# Patient Record
Sex: FEMALE | Race: WHITE | Employment: FULL TIME | URBAN - METROPOLITAN AREA
[De-identification: names, ages, dates, MRNs, and addresses within clinical notes are randomized per-mention and may not be internally consistent; named-entity substitution may affect disease eponyms.]

---
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
## 2019-01-15 ENCOUNTER — OFFICE VISIT (OUTPATIENT)
Dept: FAMILY MEDICINE | Facility: CLINIC | Age: 49
End: 2019-01-15
Attending: FAMILY MEDICINE
Payer: COMMERCIAL

## 2019-01-15 VITALS
SYSTOLIC BLOOD PRESSURE: 125 MMHG | DIASTOLIC BLOOD PRESSURE: 90 MMHG | WEIGHT: 194 LBS | HEIGHT: 70 IN | BODY MASS INDEX: 27.77 KG/M2 | HEART RATE: 85 BPM

## 2019-01-15 DIAGNOSIS — R53.83 FATIGUE, UNSPECIFIED TYPE: ICD-10-CM

## 2019-01-15 DIAGNOSIS — Z78.0 MENOPAUSE: Primary | ICD-10-CM

## 2019-01-15 DIAGNOSIS — R73.09 ELEVATED GLUCOSE: ICD-10-CM

## 2019-01-15 DIAGNOSIS — M25.50 MULTIPLE JOINT PAIN: ICD-10-CM

## 2019-01-15 DIAGNOSIS — H92.02 LEFT EAR PAIN: ICD-10-CM

## 2019-01-15 DIAGNOSIS — R63.5 WEIGHT GAIN: ICD-10-CM

## 2019-01-15 DIAGNOSIS — Z72.820 POOR SLEEP: ICD-10-CM

## 2019-01-15 DIAGNOSIS — R22.1 NECK SWELLING: ICD-10-CM

## 2019-01-15 LAB
ANION GAP SERPL CALCULATED.3IONS-SCNC: 8 MMOL/L (ref 3–14)
BUN SERPL-MCNC: 9 MG/DL (ref 7–30)
CALCIUM SERPL-MCNC: 9.4 MG/DL (ref 8.5–10.1)
CHLORIDE SERPL-SCNC: 105 MMOL/L (ref 94–109)
CO2 SERPL-SCNC: 26 MMOL/L (ref 20–32)
CREAT SERPL-MCNC: 0.66 MG/DL (ref 0.52–1.04)
CRP SERPL-MCNC: <2.9 MG/L (ref 0–8)
ERYTHROCYTE [DISTWIDTH] IN BLOOD BY AUTOMATED COUNT: 13.9 % (ref 10–15)
ESTRADIOL SERPL-MCNC: 24 PG/ML
FSH SERPL-ACNC: 70.8 IU/L
GFR SERPL CREATININE-BSD FRML MDRD: >90 ML/MIN/{1.73_M2}
GLUCOSE SERPL-MCNC: 101 MG/DL (ref 70–99)
HBA1C MFR BLD: 5.5 % (ref 0–5.6)
HCT VFR BLD AUTO: 46.6 % (ref 35–47)
HGB BLD-MCNC: 15.3 G/DL (ref 11.7–15.7)
MCH RBC QN AUTO: 30 PG (ref 26.5–33)
MCHC RBC AUTO-ENTMCNC: 32.8 G/DL (ref 31.5–36.5)
MCV RBC AUTO: 91 FL (ref 78–100)
PLATELET # BLD AUTO: 316 10E9/L (ref 150–450)
POTASSIUM SERPL-SCNC: 3.7 MMOL/L (ref 3.4–5.3)
PROGEST SERPL-MCNC: <0.2 NG/ML
RBC # BLD AUTO: 5.1 10E12/L (ref 3.8–5.2)
SODIUM SERPL-SCNC: 139 MMOL/L (ref 133–144)
T3FREE SERPL-MCNC: 2.9 PG/ML (ref 2.3–4.2)
T4 FREE SERPL-MCNC: 1.03 NG/DL (ref 0.76–1.46)
TSH SERPL DL<=0.005 MIU/L-ACNC: 1.55 MU/L (ref 0.4–4)
WBC # BLD AUTO: 7.4 10E9/L (ref 4–11)

## 2019-01-15 PROCEDURE — 86140 C-REACTIVE PROTEIN: CPT | Performed by: FAMILY MEDICINE

## 2019-01-15 PROCEDURE — 84443 ASSAY THYROID STIM HORMONE: CPT | Performed by: FAMILY MEDICINE

## 2019-01-15 PROCEDURE — G0463 HOSPITAL OUTPT CLINIC VISIT: HCPCS | Mod: ZF

## 2019-01-15 PROCEDURE — 85027 COMPLETE CBC AUTOMATED: CPT | Performed by: FAMILY MEDICINE

## 2019-01-15 PROCEDURE — 86376 MICROSOMAL ANTIBODY EACH: CPT | Performed by: FAMILY MEDICINE

## 2019-01-15 PROCEDURE — 83036 HEMOGLOBIN GLYCOSYLATED A1C: CPT | Performed by: FAMILY MEDICINE

## 2019-01-15 PROCEDURE — 82306 VITAMIN D 25 HYDROXY: CPT | Performed by: FAMILY MEDICINE

## 2019-01-15 PROCEDURE — 82670 ASSAY OF TOTAL ESTRADIOL: CPT | Performed by: FAMILY MEDICINE

## 2019-01-15 PROCEDURE — 84439 ASSAY OF FREE THYROXINE: CPT | Performed by: FAMILY MEDICINE

## 2019-01-15 PROCEDURE — 36415 COLL VENOUS BLD VENIPUNCTURE: CPT | Performed by: FAMILY MEDICINE

## 2019-01-15 PROCEDURE — 80048 BASIC METABOLIC PNL TOTAL CA: CPT | Performed by: FAMILY MEDICINE

## 2019-01-15 PROCEDURE — 83001 ASSAY OF GONADOTROPIN (FSH): CPT | Performed by: FAMILY MEDICINE

## 2019-01-15 PROCEDURE — 84144 ASSAY OF PROGESTERONE: CPT | Performed by: FAMILY MEDICINE

## 2019-01-15 PROCEDURE — 84481 FREE ASSAY (FT-3): CPT | Performed by: FAMILY MEDICINE

## 2019-01-15 RX ORDER — ELETRIPTAN HYDROBROMIDE 20 MG/1
TABLET, FILM COATED ORAL
Refills: 5 | COMMUNITY
Start: 2018-12-17 | End: 2021-06-15

## 2019-01-15 RX ORDER — TEMAZEPAM 15 MG/1
CAPSULE ORAL
Refills: 2 | COMMUNITY
Start: 2018-12-17 | End: 2021-06-15

## 2019-01-15 RX ORDER — ZINC OXIDE 216 MG/ML
LOTION TOPICAL
COMMUNITY
End: 2023-08-01

## 2019-01-15 RX ORDER — ESTRADIOL 0.04 MG/D
1 PATCH, EXTENDED RELEASE TRANSDERMAL
Qty: 24 PATCH | Refills: 3 | Status: SHIPPED | OUTPATIENT
Start: 2019-01-17 | End: 2019-03-21

## 2019-01-15 SDOH — HEALTH STABILITY: MENTAL HEALTH: HOW MANY STANDARD DRINKS CONTAINING ALCOHOL DO YOU HAVE ON A TYPICAL DAY?: 1 OR 2

## 2019-01-15 SDOH — HEALTH STABILITY: MENTAL HEALTH: HOW OFTEN DO YOU HAVE A DRINK CONTAINING ALCOHOL?: NOT ASKED

## 2019-01-15 ASSESSMENT — ANXIETY QUESTIONNAIRES
3. WORRYING TOO MUCH ABOUT DIFFERENT THINGS: NOT AT ALL
6. BECOMING EASILY ANNOYED OR IRRITABLE: NOT AT ALL
GAD7 TOTAL SCORE: 3
5. BEING SO RESTLESS THAT IT IS HARD TO SIT STILL: NOT AT ALL
7. FEELING AFRAID AS IF SOMETHING AWFUL MIGHT HAPPEN: SEVERAL DAYS
2. NOT BEING ABLE TO STOP OR CONTROL WORRYING: SEVERAL DAYS
1. FEELING NERVOUS, ANXIOUS, OR ON EDGE: NOT AT ALL

## 2019-01-15 ASSESSMENT — PATIENT HEALTH QUESTIONNAIRE - PHQ9
SUM OF ALL RESPONSES TO PHQ QUESTIONS 1-9: 13
5. POOR APPETITE OR OVEREATING: SEVERAL DAYS

## 2019-01-15 ASSESSMENT — PAIN SCALES - GENERAL: PAINLEVEL: NO PAIN (0)

## 2019-01-15 ASSESSMENT — MIFFLIN-ST. JEOR: SCORE: 1594.2
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
## 2019-01-15 NOTE — LETTER
Date:January 17, 2019      Patient was self referred, no letter generated. Do not send.        [facility] Information

## 2019-01-15 NOTE — PATIENT INSTRUCTIONS
Follow-up with lee graves in 6-8 weeks    Await labs: if normal    Start vivelle dot 0.0375 mg   And progesterone 100 mg

## 2019-01-16 LAB
DEPRECATED CALCIDIOL+CALCIFEROL SERPL-MC: 44 UG/L (ref 20–75)
THYROPEROXIDASE AB SERPL-ACNC: <10 IU/ML

## 2019-01-16 ASSESSMENT — ANXIETY QUESTIONNAIRES: GAD7 TOTAL SCORE: 3
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
## 2019-03-21 ENCOUNTER — ANCILLARY PROCEDURE (OUTPATIENT)
Dept: MAMMOGRAPHY | Facility: CLINIC | Age: 49
End: 2019-03-21
Attending: NURSE PRACTITIONER
Payer: COMMERCIAL

## 2019-03-21 ENCOUNTER — OFFICE VISIT (OUTPATIENT)
Dept: OBGYN | Facility: CLINIC | Age: 49
End: 2019-03-21
Attending: NURSE PRACTITIONER
Payer: COMMERCIAL

## 2019-03-21 VITALS
WEIGHT: 200 LBS | SYSTOLIC BLOOD PRESSURE: 120 MMHG | HEART RATE: 81 BPM | BODY MASS INDEX: 28.63 KG/M2 | DIASTOLIC BLOOD PRESSURE: 82 MMHG | HEIGHT: 70 IN

## 2019-03-21 DIAGNOSIS — N95.0 POSTMENOPAUSAL BLEEDING: Primary | ICD-10-CM

## 2019-03-21 DIAGNOSIS — Z12.31 VISIT FOR SCREENING MAMMOGRAM: ICD-10-CM

## 2019-03-21 DIAGNOSIS — Z78.0 MENOPAUSE: ICD-10-CM

## 2019-03-21 PROCEDURE — 77067 SCR MAMMO BI INCL CAD: CPT

## 2019-03-21 RX ORDER — ESTRADIOL 0.04 MG/D
1 PATCH, EXTENDED RELEASE TRANSDERMAL
Qty: 24 PATCH | Refills: 3 | Status: SHIPPED | OUTPATIENT
Start: 2019-03-21 | End: 2020-03-03

## 2019-03-21 ASSESSMENT — PAIN SCALES - GENERAL: PAINLEVEL: NO PAIN (0)

## 2019-03-21 ASSESSMENT — MIFFLIN-ST. JEOR: SCORE: 1621.41
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
## 2019-03-21 NOTE — LETTER
3/21/2019       RE: Richelle Laura  [address]  [facility] [address]     Dear Colleague,    Thank you for referring your patient, Richelle Laura, to the [facility] at [facility]. Please see a copy of my visit note below.  Progress Note    SUBJECTIVE:  Richelle Laura is an 49 year old, , here for follow up on HT start in January, saw Dr Luther.    Reports LNMP 2016 age 46. Started HT and had a bleed in 2017, stopped HT. Started HT 2019 and had a 'normal menses'  and 3/4. Takes continuous Vivelle and Prometrium.    States all menopause symptoms are greatly improved and desires to continue.  Sleeping well, busy with new  position.  Not sexually active, recently   Smokes 3 cigg. /per day  BMI >28, would like  to lose 50 lbs, states normal weight is 150      Divorce in [place] 2018 relocated to [place]. Reports stress with divorce process, relocating and finding work as a .  Supportive family in [place], recently became a grandmother,  20      HISTORY:    Current Outpatient Medications on File Prior to Visit:  eletriptan (RELPAX) 20 MG tablet TK 1 T PO D PRN   estradiol (VIVELLE-DOT) 0.0375 MG/24HR BIW patch Place 1 patch onto the skin twice a week   Nutritional Supplements (NUTRITIONAL SUPPLEMENT PLUS) LIQD Hemp oil   PROGESTERONE 100MG CAPSULES COMPOUND Take 100 capsules by mouth At Bedtime   temazepam (RESTORIL) 15 MG capsule TK 1-2 C PO QD HS   UNABLE TO FIND      No current facility-administered medications on file prior to visit.   Allergies   Allergen Reactions     Sulfa Drugs Hives     PN: Swelling     Compazine [Prochlorperazine]      Phenothiazines      PN:  skin crawls,sleeplessness         There is no immunization history on file for this patient.    Obstetric History       T0      L1     SAB1   TAB0   Ectopic0   Multiple0   Live Births0      Past Medical History:   Diagnosis Date     Migraine      Past Surgical History:    Procedure Laterality Date     ABDOMEN SURGERY        SECTION       KNEE SURGERY       Family History   Problem Relation Age of Onset     Pancreatic Cancer Mother      Breast Cancer Maternal Aunt 34        BRCA 1 gene     Social History     Socioeconomic History     Marital status:      Spouse name: None     Number of children: None     Years of education: None     Highest education level: None   Occupational History     None   Social Needs     Financial resource strain: None     Food insecurity:     Worry: None     Inability: None     Transportation needs:     Medical: None     Non-medical: None   Tobacco Use     Smoking status: Smoker, Current Status Unknown     Types: Cigarettes     Smokeless tobacco: Never Used     Tobacco comment: 3-4 cigarettes a day   Substance and Sexual Activity     Alcohol use: Yes     Alcohol/week: 1.2 - 1.8 oz     Types: 2 - 3 Glasses of wine per week     Drinks per session: 1 or 2     Drug use: Yes     Sexual activity: Not Currently   Lifestyle     Physical activity:     Days per week: None     Minutes per session: None     Stress: None   Relationships     Social connections:     Talks on phone: None     Gets together: None     Attends Faith service: None     Active member of club or organization: None     Attends meetings of clubs or organizations: None     Relationship status: None     Intimate partner violence:     Fear of current or ex partner: None     Emotionally abused: None     Physically abused: None     Forced sexual activity: None   Other Topics Concern     None   Social History Narrative    How much exercise per week? Physical at job-     How much calcium per day? In foods        How much caffeine per day? 1-2 cups    How much vitamin D per day? supplement    Do you/your family wear seatbelts?  Yes    Do you/your family use safety helmets? Yes    Do you/your family use sunscreen? n/a    Do you/your family keep firearms in the home? No    Do you/your  "family have a smoke detector(s)? Yes        January 15, 2019 Refugio Romo LPN       ROS  [unfilled]  PHQ-9 SCORE 1/15/2019   PHQ-9 Total Score 13     BOGDAN-7 SCORE 1/15/2019   Total Score 3         EXAM:  Blood pressure 120/82, pulse 81, height 1.784 m (5' 10.25\"), weight 90.7 kg (200 lb). Body mass index is 28.49 kg/m .  Constitutional: Well appearing woman in no acute distress, appropriately groomed   Oriented to time and place, engaged and interactive   Psychological: Appropriate mood  Eyes symmetrical, sclera clear and white, conjunctiva pink and moist   Mouth: moist mucous membranes, no visible dental caries   Skin: warm and dry, no visible rashes or lesions  Neuro: normal gait, no visible tremor   Musculoskeletal: Full ROM, no muscular weakness visible   30 minutes was spent in direct contact with patient and > 50% of the total time in patient education and coordination of care.     ASSESSMENT:  Encounter Diagnoses   Name Primary?     Postmenopausal bleeding Yes     Menopause         PLAN:   Orders Placed This Encounter   Procedures     US Transvaginal Non OB     US GYN Complete Transvaginal - 28623 (In Clinic)     Continue HT as prescribed, discussed risks an concerns with post menopausal bleeding  Schedule TV US to assess endometrium today. Discussed possibly going to cyclic HT, endometrial biopsy. Await TVUS.  Schedule follow up visit with Dr Luther in May    Virginia Hansen DNP LAURIE    "

## 2019-08-15 ENCOUNTER — ANCILLARY PROCEDURE (OUTPATIENT)
Dept: ULTRASOUND IMAGING | Facility: CLINIC | Age: 49
End: 2019-08-15
Attending: NURSE PRACTITIONER
Payer: COMMERCIAL

## 2019-08-15 ENCOUNTER — OFFICE VISIT (OUTPATIENT)
Dept: FAMILY MEDICINE | Facility: CLINIC | Age: 49
End: 2019-08-15
Attending: FAMILY MEDICINE
Payer: COMMERCIAL

## 2019-08-15 VITALS
WEIGHT: 186.5 LBS | HEART RATE: 73 BPM | SYSTOLIC BLOOD PRESSURE: 121 MMHG | HEIGHT: 70 IN | DIASTOLIC BLOOD PRESSURE: 82 MMHG | BODY MASS INDEX: 26.7 KG/M2

## 2019-08-15 DIAGNOSIS — N95.0 POSTMENOPAUSAL BLEEDING: ICD-10-CM

## 2019-08-15 DIAGNOSIS — G43.819 OTHER MIGRAINE WITHOUT STATUS MIGRAINOSUS, INTRACTABLE: ICD-10-CM

## 2019-08-15 DIAGNOSIS — Z79.890 HORMONE REPLACEMENT THERAPY: Primary | ICD-10-CM

## 2019-08-15 DIAGNOSIS — Z78.0 MENOPAUSE: ICD-10-CM

## 2019-08-15 DIAGNOSIS — N84.0 ENDOMETRIAL POLYP: Primary | ICD-10-CM

## 2019-08-15 PROCEDURE — 76830 TRANSVAGINAL US NON-OB: CPT

## 2019-08-15 PROCEDURE — G0463 HOSPITAL OUTPT CLINIC VISIT: HCPCS | Mod: 25,ZF

## 2019-08-15 RX ORDER — DIHYDROERGOTAMINE MESYLATE 4 MG/ML
1 SPRAY NASAL PRN
Qty: 1 ML | Refills: 3 | Status: SHIPPED | OUTPATIENT
Start: 2019-08-15 | End: 2021-06-15

## 2019-08-15 RX ORDER — ACETAMINOPHEN 325 MG/1
975 TABLET ORAL ONCE
Status: CANCELLED | OUTPATIENT
Start: 2019-08-15 | End: 2019-08-15

## 2019-08-15 ASSESSMENT — PATIENT HEALTH QUESTIONNAIRE - PHQ9
5. POOR APPETITE OR OVEREATING: SEVERAL DAYS
SUM OF ALL RESPONSES TO PHQ QUESTIONS 1-9: 2

## 2019-08-15 ASSESSMENT — ANXIETY QUESTIONNAIRES
2. NOT BEING ABLE TO STOP OR CONTROL WORRYING: NOT AT ALL
1. FEELING NERVOUS, ANXIOUS, OR ON EDGE: SEVERAL DAYS
7. FEELING AFRAID AS IF SOMETHING AWFUL MIGHT HAPPEN: SEVERAL DAYS
GAD7 TOTAL SCORE: 3
5. BEING SO RESTLESS THAT IT IS HARD TO SIT STILL: NOT AT ALL
6. BECOMING EASILY ANNOYED OR IRRITABLE: NOT AT ALL
3. WORRYING TOO MUCH ABOUT DIFFERENT THINGS: NOT AT ALL

## 2019-08-15 ASSESSMENT — MIFFLIN-ST. JEOR: SCORE: 1560.18

## 2019-08-15 ASSESSMENT — PAIN SCALES - GENERAL: PAINLEVEL: NO PAIN (0)
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
## 2019-08-15 NOTE — NURSING NOTE
Chief Complaint   Patient presents with     RECHECK     Pt here to discuss pelvic ultrasound results. Pt having irregular periods and breast tenderness.

## 2019-08-16 ASSESSMENT — ANXIETY QUESTIONNAIRES: GAD7 TOTAL SCORE: 3

## 2019-08-20 ENCOUNTER — TELEPHONE (OUTPATIENT)
Dept: OBGYN | Facility: CLINIC | Age: 49
End: 2019-08-20

## 2019-08-20 NOTE — TELEPHONE ENCOUNTER
Confirmed surgery date with patient 9/10/19 with arrival time at 6:30a.m with nothing to eat eight hours before scheduled surgery time and clear liquids up to two hours before scheduled surgery time, patient sched pre op 9/5/19 with Dr. Luther, also informed patient that a surgery map and letter will be mailed out.     to complete the following fields:            CHECKLIST     Google Calendar : Yes     Resident notified: Not Applicable     Clinic schedule blocked:  Not Applicable    Patient notified:Yes      Pre op information sent: Yes     Given to patient over the phone.Yes    Comments:

## 2019-08-22 ENCOUNTER — TELEPHONE (OUTPATIENT)
Dept: PHARMACY | Facility: CLINIC | Age: 49
End: 2019-08-22

## 2019-09-04 NOTE — PROGRESS NOTES
"Patient Name:  Richelle Laura present for Pre-operative clearance  Procedure: Operative Hysteroscopy, With Morcellator  Surgeon: Dr. Maurer  Date of Surgery:  9.10.2019  Location of Surgery: [facility]   Fax number for pre-op form:  Fax: [place] [phone] & [phone]    Chief Complaint: postmenopausal with abnormal bleeding on HT: estradiol patch 0.0375 and progesterone 100 mg.   Pelvic US showed uterine polyps and lining is 3.9mm        Allergies:   Allergies   Allergen Reactions     Sulfa Drugs Hives     PN: Swelling     Compazine [Prochlorperazine]      Phenothiazines      PN:  skin crawls,sleeplessness        History of anesthesia reaction: none  Blood transfusion: no  Smoker: 3-4 cigs/day  Advanced Directive: Advocate is aunt [Lavern Truong]    PAST MEDICAL HISTORY:  =====================  Past Medical History:   Diagnosis Date     Migraine      PAST SURGICAL HISTORY:  ======================  Past Surgical History:   Procedure Laterality Date     ABDOMEN SURGERY        SECTION       KNEE SURGERY       REVIEW OF SYSTEMS:  ==================  CONSTITUTIONAL: NEGATIVE for fever, chills, change in weight  ENT/MOUTH: NEGATIVE for ear, mouth and throat problems  RESP: NEGATIVE for significant cough or SOB  CV: NEGATIVE for chest pain, palpitations or peripheral edema    MEDICATIONS:  =====  Current Outpatient Medications   Medication     eletriptan (RELPAX) 20 MG tablet     estradiol (VIVELLE-DOT) 0.0375 MG/24HR BIW patch     Nutritional Supplements (NUTRITIONAL SUPPLEMENT PLUS) LIQD     PROGESTERONE 100MG CAPSULES COMPOUND     temazepam (RESTORIL) 15 MG capsule     dihydroergotamine (MIGRANAL) 4 MG/ML nasal spray     EXAM:  =====  /77   Pulse 75   Ht 1.784 m (5' 10.25\")   Wt 85.5 kg (188 lb 6.4 oz)   BMI 26.84 kg/m    GENERAL APPEARANCE: healthy, alert and no distress  HENT: ear canals and TM's normal and nose and mouth without ulcers or lesions  RESP: lungs clear to auscultation - no " rales, rhonchi or wheezes  CV: regular rate and rhythm, normal S1 S2, no S3 or S4 and no murmur, click or rub   ABDOMEN: soft, nontender, no HSM or masses and bowel sounds normal  NEURO: Normal strength and tone, sensory exam grossly normal, mentation intact and speech normal     DIAGNOSTICS:   ============  No labs or EKG required for low risk surgery (cataract, skin procedure, breast biopsy, etc)    IMPRESSION:  ===========  49 year old postmenopausal woman on hormone therapy who will have Operative Hysteroscopy, with Morcellator for uterine polyps and abnormal postmenopausal bleeding by Dr. Maurer on Tuesday, September 10, 2019.      Hormone therapy:         Estradiol (VIVELLE-DOT) 0.0375 MG/24HR; Place 1 patch onto the skin twice a week        Micronized progesterone (PROMETRIUM) 100 MG capsule; Take 1 capsule by mouth daily at HS.    For above listed surgery and anesthesia:   Patient is LOW risk for surgery and perioperative complications.    RECOMMENDATIONS:  ==================  Below recommendations were reviewed with patient  Approval given to proceed with proposed procedure, without further diagnostic evaluation.      Signed Electronically by: Barbi Luther MD    Copy of this consultation report is provided to requesting physician.

## 2019-09-05 ENCOUNTER — OFFICE VISIT (OUTPATIENT)
Dept: FAMILY MEDICINE | Facility: CLINIC | Age: 49
End: 2019-09-05
Attending: FAMILY MEDICINE
Payer: COMMERCIAL

## 2019-09-05 VITALS
DIASTOLIC BLOOD PRESSURE: 77 MMHG | SYSTOLIC BLOOD PRESSURE: 113 MMHG | WEIGHT: 188.4 LBS | HEART RATE: 75 BPM | BODY MASS INDEX: 26.97 KG/M2 | HEIGHT: 70 IN

## 2019-09-05 DIAGNOSIS — Z01.818 PRE-OPERATIVE EXAMINATION: ICD-10-CM

## 2019-09-05 PROCEDURE — G0463 HOSPITAL OUTPT CLINIC VISIT: HCPCS | Mod: ZF

## 2019-09-05 ASSESSMENT — ANXIETY QUESTIONNAIRES
3. WORRYING TOO MUCH ABOUT DIFFERENT THINGS: SEVERAL DAYS
7. FEELING AFRAID AS IF SOMETHING AWFUL MIGHT HAPPEN: SEVERAL DAYS
6. BECOMING EASILY ANNOYED OR IRRITABLE: NOT AT ALL
5. BEING SO RESTLESS THAT IT IS HARD TO SIT STILL: NOT AT ALL
1. FEELING NERVOUS, ANXIOUS, OR ON EDGE: SEVERAL DAYS
2. NOT BEING ABLE TO STOP OR CONTROL WORRYING: SEVERAL DAYS
GAD7 TOTAL SCORE: 5

## 2019-09-05 ASSESSMENT — PATIENT HEALTH QUESTIONNAIRE - PHQ9
5. POOR APPETITE OR OVEREATING: SEVERAL DAYS
SUM OF ALL RESPONSES TO PHQ QUESTIONS 1-9: 5

## 2019-09-05 ASSESSMENT — MIFFLIN-ST. JEOR: SCORE: 1568.8

## 2019-09-05 ASSESSMENT — PAIN SCALES - GENERAL: PAINLEVEL: NO PAIN (0)

## 2019-09-05 NOTE — LETTER
2019       RE: Richelle Laura  [address]  [facility] [address]     Dear Colleague,    Thank you for referring your patient, Richelle Laura, to the [facility] at [facility]. Please see a copy of my visit note below.    Patient Name:  Richelle Laura present for Pre-operative clearance  Procedure: Operative Hysteroscopy, With Morcellator  Surgeon: Dr. Maurer  Date of Surgery:  9.10.2019  Location of Surgery: [facility]   Fax number for pre-op form:  Fax: [place] [phone] & [phone]    Chief Complaint: postmenopausal with abnormal bleeding on HT: estradiol patch 0.0375 and progesterone 100 mg.   Pelvic US showed uterine polyps and lining is 3.9mm        Allergies:   Allergies   Allergen Reactions     Sulfa Drugs Hives     PN: Swelling     Compazine [Prochlorperazine]      Phenothiazines      PN:  skin crawls,sleeplessness        History of anesthesia reaction: none  Blood transfusion: no  Smoker: 3-4 cigs/day  Advanced Directive: Advocate is aunt [Lavern Truong]    PAST MEDICAL HISTORY:  =====================  Past Medical History:   Diagnosis Date     Migraine      PAST SURGICAL HISTORY:  ======================  Past Surgical History:   Procedure Laterality Date     ABDOMEN SURGERY        SECTION       KNEE SURGERY       REVIEW OF SYSTEMS:  ==================  CONSTITUTIONAL: NEGATIVE for fever, chills, change in weight  ENT/MOUTH: NEGATIVE for ear, mouth and throat problems  RESP: NEGATIVE for significant cough or SOB  CV: NEGATIVE for chest pain, palpitations or peripheral edema    MEDICATIONS:  =====  Current Outpatient Medications   Medication     eletriptan (RELPAX) 20 MG tablet     estradiol (VIVELLE-DOT) 0.0375 MG/24HR BIW patch     Nutritional Supplements (NUTRITIONAL SUPPLEMENT PLUS) LIQD     PROGESTERONE 100MG CAPSULES COMPOUND     temazepam (RESTORIL) 15 MG capsule     dihydroergotamine (MIGRANAL) 4 MG/ML nasal spray  "    EXAM:  =====  /77   Pulse 75   Ht 1.784 m (5' 10.25\")   Wt 85.5 kg (188 lb 6.4 oz)   BMI 26.84 kg/m     GENERAL APPEARANCE: healthy, alert and no distress  HENT: ear canals and TM's normal and nose and mouth without ulcers or lesions  RESP: lungs clear to auscultation - no rales, rhonchi or wheezes  CV: regular rate and rhythm, normal S1 S2, no S3 or S4 and no murmur, click or rub   ABDOMEN: soft, nontender, no HSM or masses and bowel sounds normal  NEURO: Normal strength and tone, sensory exam grossly normal, mentation intact and speech normal     DIAGNOSTICS:   ============  No labs or EKG required for low risk surgery (cataract, skin procedure, breast biopsy, etc)    IMPRESSION:  ===========  49 year old postmenopausal woman on hormone therapy who will have Operative Hysteroscopy, with Morcellator for uterine polyps and abnormal postmenopausal bleeding by Dr. Maurer on Tuesday, September 10, 2019.      Hormone therapy:         Estradiol (VIVELLE-DOT) 0.0375 MG/24HR; Place 1 patch onto the skin twice a week        Micronized progesterone (PROMETRIUM) 100 MG capsule; Take 1 capsule by mouth daily at HS.    For above listed surgery and anesthesia:   Patient is LOW risk for surgery and perioperative complications.    RECOMMENDATIONS:  ==================  Below recommendations were reviewed with patient  Approval given to proceed with proposed procedure, without further diagnostic evaluation.    Signed Electronically by: Barbi Luther MD    Copy of this consultation report is provided to requesting physician.        "

## 2019-09-05 NOTE — NURSING NOTE
Chief Complaint   Patient presents with     Pre-Op Exam     9/10/19 Dr. Maurer, Operative Hysteroscopy, With Morcellator

## 2019-09-06 ASSESSMENT — ANXIETY QUESTIONNAIRES: GAD7 TOTAL SCORE: 5

## 2019-09-09 ENCOUNTER — ANESTHESIA EVENT (OUTPATIENT)
Dept: SURGERY | Facility: CLINIC | Age: 49
End: 2019-09-09
Payer: COMMERCIAL

## 2019-09-09 ASSESSMENT — LIFESTYLE VARIABLES: TOBACCO_USE: 1

## 2019-09-09 NOTE — ANESTHESIA PREPROCEDURE EVALUATION
Anesthesia Pre-Procedure Evaluation    Patient: Richelle Laura   MRN:     [id number] Gender:   female   Age:    49 year old :      1970        Preoperative Diagnosis: Irregular Bleeding, Possible Endometrial Polyps   Procedure(s):  Operative Hysteroscopy, With Morcellator     Past Medical History:   Diagnosis Date     Migraine       Past Surgical History:   Procedure Laterality Date     ABDOMEN SURGERY        SECTION       KNEE SURGERY            Anesthesia Evaluation     . Pt has had prior anesthetic.     No history of anesthetic complications          ROS/MED HX    ENT/Pulmonary:     (+)tobacco use, Current use , . .    Neurologic:     (+)migraines,     Cardiovascular:  - neg cardiovascular ROS   (+) ----. : . . . :. . No previous cardiac testing       METS/Exercise Tolerance:  >4 METS   Hematologic:  - neg hematologic  ROS       Musculoskeletal:  - neg musculoskeletal ROS       GI/Hepatic:  - neg GI/hepatic ROS       Renal/Genitourinary:  - ROS Renal section negative       Endo:  - neg endo ROS       Psychiatric:  - neg psychiatric ROS       Infectious Disease:  - neg infectious disease ROS       Malignancy:      - no malignancy   Other:    (+) no H/O Chronic Pain,no other significant disability                        PHYSICAL EXAM:   Mental Status/Neuro: A/A/O   Airway: Facies: Feasible   Respiratory:   Resp. Effort: Normal      CV:   Rate: Age appropriate   Comments:                      LABS:  CBC:   Lab Results   Component Value Date    WBC 7.4 01/15/2019    HGB 15.3 01/15/2019    HCT 46.6 01/15/2019     01/15/2019     BMP:   Lab Results   Component Value Date     01/15/2019    POTASSIUM 3.7 01/15/2019    CHLORIDE 105 01/15/2019    CO2 26 01/15/2019    BUN 9 01/15/2019    CR 0.66 01/15/2019     (H) 01/15/2019     COAGS: No results found for: PTT, INR, FIBR  POC: No results found for: BGM, HCG, HCGS  OTHER:   Lab Results   Component Value Date    A1C 5.5 01/15/2019    JAGDEEP 9.4  "01/15/2019    TSH 1.55 01/15/2019    T4 1.03 01/15/2019    CRP <2.9 01/15/2019        Preop Vitals    BP Readings from Last 3 Encounters:   09/05/19 113/77   08/15/19 121/82   03/21/19 120/82    Pulse Readings from Last 3 Encounters:   09/05/19 75   08/15/19 73   03/21/19 81      Resp Readings from Last 3 Encounters:   No data found for Resp    SpO2 Readings from Last 3 Encounters:   No data found for SpO2      Temp Readings from Last 1 Encounters:   No data found for Temp    Ht Readings from Last 1 Encounters:   09/05/19 1.784 m (5' 10.25\")      Wt Readings from Last 1 Encounters:   09/05/19 85.5 kg (188 lb 6.4 oz)    Estimated body mass index is 26.84 kg/m  as calculated from the following:    Height as of 9/5/19: 1.784 m (5' 10.25\").    Weight as of 9/5/19: 85.5 kg (188 lb 6.4 oz).     LDA:        Assessment:   ASA SCORE: 2    H&P: History and physical reviewed and following examination; no interval change.   Smoking Status:  Active Smoker   NPO Status: NPO Appropriate     Plan:   Anes. Type:  MAC   Pre-Medication: Midazolam   Induction:  IV (Standard)   Airway: Native Airway   Access/Monitoring: PIV   Maintenance: Propofol Sedation     Postop Plan:   Postop Pain: None  Postop Sedation/Airway: Not planned  Disposition: Outpatient     PONV Management: Adult Risk Factors: Female   Prevention: Ondansetron, Dexamethasone, Propofol     CONSENT: Direct conversation   Plan and risks discussed with: Patient                      Randall Ramirez DO  "

## 2019-09-10 ENCOUNTER — HOSPITAL ENCOUNTER (OUTPATIENT)
Facility: CLINIC | Age: 49
Discharge: HOME | End: 2019-09-10
Attending: OBSTETRICS & GYNECOLOGY | Admitting: OBSTETRICS & GYNECOLOGY
Payer: COMMERCIAL

## 2019-09-10 ENCOUNTER — ANESTHESIA (OUTPATIENT)
Dept: SURGERY | Facility: CLINIC | Age: 49
End: 2019-09-10
Payer: COMMERCIAL

## 2019-09-10 VITALS
SYSTOLIC BLOOD PRESSURE: 119 MMHG | OXYGEN SATURATION: 100 % | TEMPERATURE: 97.7 F | WEIGHT: 187.17 LBS | BODY MASS INDEX: 26.8 KG/M2 | HEART RATE: 69 BPM | HEIGHT: 70 IN | RESPIRATION RATE: 16 BRPM | DIASTOLIC BLOOD PRESSURE: 85 MMHG

## 2019-09-10 DIAGNOSIS — G89.18 POST-OP PAIN: Primary | ICD-10-CM

## 2019-09-10 LAB
ABO + RH BLD: NORMAL
ABO + RH BLD: NORMAL
B-HCG SERPL-ACNC: <1 IU/L (ref 0–5)
BLD GP AB SCN SERPL QL: NORMAL
BLOOD BANK CMNT PATIENT-IMP: NORMAL
GLUCOSE SERPL-MCNC: 98 MG/DL (ref 70–99)
HGB BLD-MCNC: 15 G/DL (ref 11.7–15.7)
SPECIMEN EXP DATE BLD: NORMAL

## 2019-09-10 PROCEDURE — 25000128 H RX IP 250 OP 636: Performed by: STUDENT IN AN ORGANIZED HEALTH CARE EDUCATION/TRAINING PROGRAM

## 2019-09-10 PROCEDURE — 37000008 ZZH ANESTHESIA TECHNICAL FEE, 1ST 30 MIN: Performed by: OBSTETRICS & GYNECOLOGY

## 2019-09-10 PROCEDURE — 25000132 ZZH RX MED GY IP 250 OP 250 PS 637: Performed by: OBSTETRICS & GYNECOLOGY

## 2019-09-10 PROCEDURE — 85018 HEMOGLOBIN: CPT | Performed by: OBSTETRICS & GYNECOLOGY

## 2019-09-10 PROCEDURE — 25000128 H RX IP 250 OP 636: Performed by: ANESTHESIOLOGY

## 2019-09-10 PROCEDURE — 25000125 ZZHC RX 250: Performed by: OBSTETRICS & GYNECOLOGY

## 2019-09-10 PROCEDURE — 25000566 ZZH SEVOFLURANE, EA 15 MIN: Performed by: OBSTETRICS & GYNECOLOGY

## 2019-09-10 PROCEDURE — 37000009 ZZH ANESTHESIA TECHNICAL FEE, EACH ADDTL 15 MIN: Performed by: OBSTETRICS & GYNECOLOGY

## 2019-09-10 PROCEDURE — 86850 RBC ANTIBODY SCREEN: CPT | Performed by: ANESTHESIOLOGY

## 2019-09-10 PROCEDURE — 88305 TISSUE EXAM BY PATHOLOGIST: CPT | Mod: 26 | Performed by: OBSTETRICS & GYNECOLOGY

## 2019-09-10 PROCEDURE — 71000027 ZZH RECOVERY PHASE 2 EACH 15 MINS: Performed by: OBSTETRICS & GYNECOLOGY

## 2019-09-10 PROCEDURE — 86900 BLOOD TYPING SEROLOGIC ABO: CPT | Performed by: ANESTHESIOLOGY

## 2019-09-10 PROCEDURE — 25000132 ZZH RX MED GY IP 250 OP 250 PS 637: Performed by: ANESTHESIOLOGY

## 2019-09-10 PROCEDURE — 36000059 ZZH SURGERY LEVEL 3 EA 15 ADDTL MIN UMMC: Performed by: OBSTETRICS & GYNECOLOGY

## 2019-09-10 PROCEDURE — 27210794 ZZH OR GENERAL SUPPLY STERILE: Performed by: OBSTETRICS & GYNECOLOGY

## 2019-09-10 PROCEDURE — 40000170 ZZH STATISTIC PRE-PROCEDURE ASSESSMENT II: Performed by: OBSTETRICS & GYNECOLOGY

## 2019-09-10 PROCEDURE — 25000125 ZZHC RX 250: Performed by: STUDENT IN AN ORGANIZED HEALTH CARE EDUCATION/TRAINING PROGRAM

## 2019-09-10 PROCEDURE — 00000159 ZZHCL STATISTIC H-SEND OUTS PREP: Performed by: OBSTETRICS & GYNECOLOGY

## 2019-09-10 PROCEDURE — 88305 TISSUE EXAM BY PATHOLOGIST: CPT | Performed by: OBSTETRICS & GYNECOLOGY

## 2019-09-10 PROCEDURE — 84702 CHORIONIC GONADOTROPIN TEST: CPT | Performed by: OBSTETRICS & GYNECOLOGY

## 2019-09-10 PROCEDURE — 82947 ASSAY GLUCOSE BLOOD QUANT: CPT | Performed by: OBSTETRICS & GYNECOLOGY

## 2019-09-10 PROCEDURE — 86901 BLOOD TYPING SEROLOGIC RH(D): CPT | Performed by: ANESTHESIOLOGY

## 2019-09-10 PROCEDURE — 36000057 ZZH SURGERY LEVEL 3 1ST 30 MIN - UMMC: Performed by: OBSTETRICS & GYNECOLOGY

## 2019-09-10 RX ORDER — SODIUM CHLORIDE, SODIUM LACTATE, POTASSIUM CHLORIDE, CALCIUM CHLORIDE 600; 310; 30; 20 MG/100ML; MG/100ML; MG/100ML; MG/100ML
INJECTION, SOLUTION INTRAVENOUS CONTINUOUS
Status: DISCONTINUED | OUTPATIENT
Start: 2019-09-10 | End: 2019-09-16 | Stop reason: HOSPADM

## 2019-09-10 RX ORDER — LIDOCAINE HYDROCHLORIDE 20 MG/ML
INJECTION, SOLUTION INFILTRATION; PERINEURAL PRN
Status: DISCONTINUED | OUTPATIENT
Start: 2019-09-10 | End: 2019-09-10

## 2019-09-10 RX ORDER — MEPERIDINE HYDROCHLORIDE 25 MG/ML
12.5 INJECTION INTRAMUSCULAR; INTRAVENOUS; SUBCUTANEOUS
Status: DISCONTINUED | OUTPATIENT
Start: 2019-09-10 | End: 2019-09-16 | Stop reason: HOSPADM

## 2019-09-10 RX ORDER — ONDANSETRON 2 MG/ML
INJECTION INTRAMUSCULAR; INTRAVENOUS PRN
Status: DISCONTINUED | OUTPATIENT
Start: 2019-09-10 | End: 2019-09-10

## 2019-09-10 RX ORDER — IBUPROFEN 600 MG/1
600 TABLET, FILM COATED ORAL EVERY 6 HOURS PRN
Qty: 30 TABLET | Refills: 0 | Status: SHIPPED | OUTPATIENT
Start: 2019-09-10 | End: 2020-10-06

## 2019-09-10 RX ORDER — ACETAMINOPHEN 325 MG/1
975 TABLET ORAL ONCE
Status: COMPLETED | OUTPATIENT
Start: 2019-09-10 | End: 2019-09-10

## 2019-09-10 RX ORDER — LIDOCAINE 40 MG/G
CREAM TOPICAL
Status: DISCONTINUED | OUTPATIENT
Start: 2019-09-10 | End: 2019-09-10 | Stop reason: HOSPADM

## 2019-09-10 RX ORDER — NALOXONE HYDROCHLORIDE 0.4 MG/ML
.1-.4 INJECTION, SOLUTION INTRAMUSCULAR; INTRAVENOUS; SUBCUTANEOUS
Status: DISCONTINUED | OUTPATIENT
Start: 2019-09-10 | End: 2019-09-11 | Stop reason: HOSPADM

## 2019-09-10 RX ORDER — ACETAMINOPHEN 325 MG/1
650 TABLET ORAL EVERY 4 HOURS PRN
Qty: 50 TABLET | Refills: 0 | Status: SHIPPED | OUTPATIENT
Start: 2019-09-10 | End: 2021-06-15

## 2019-09-10 RX ORDER — ONDANSETRON 4 MG/1
4 TABLET, ORALLY DISINTEGRATING ORAL EVERY 30 MIN PRN
Status: DISCONTINUED | OUTPATIENT
Start: 2019-09-10 | End: 2019-09-16 | Stop reason: HOSPADM

## 2019-09-10 RX ORDER — LIDOCAINE HYDROCHLORIDE 10 MG/ML
INJECTION, SOLUTION INFILTRATION; PERINEURAL PRN
Status: DISCONTINUED | OUTPATIENT
Start: 2019-09-10 | End: 2019-09-10 | Stop reason: HOSPADM

## 2019-09-10 RX ORDER — DEXAMETHASONE SODIUM PHOSPHATE 4 MG/ML
INJECTION, SOLUTION INTRA-ARTICULAR; INTRALESIONAL; INTRAMUSCULAR; INTRAVENOUS; SOFT TISSUE PRN
Status: DISCONTINUED | OUTPATIENT
Start: 2019-09-10 | End: 2019-09-10

## 2019-09-10 RX ORDER — SODIUM CHLORIDE, SODIUM LACTATE, POTASSIUM CHLORIDE, CALCIUM CHLORIDE 600; 310; 30; 20 MG/100ML; MG/100ML; MG/100ML; MG/100ML
INJECTION, SOLUTION INTRAVENOUS CONTINUOUS
Status: DISCONTINUED | OUTPATIENT
Start: 2019-09-10 | End: 2019-09-10 | Stop reason: HOSPADM

## 2019-09-10 RX ORDER — FENTANYL CITRATE 50 UG/ML
INJECTION, SOLUTION INTRAMUSCULAR; INTRAVENOUS PRN
Status: DISCONTINUED | OUTPATIENT
Start: 2019-09-10 | End: 2019-09-10

## 2019-09-10 RX ORDER — KETAMINE HYDROCHLORIDE 10 MG/ML
INJECTION, SOLUTION INTRAMUSCULAR; INTRAVENOUS PRN
Status: DISCONTINUED | OUTPATIENT
Start: 2019-09-10 | End: 2019-09-10

## 2019-09-10 RX ORDER — ONDANSETRON 2 MG/ML
4 INJECTION INTRAMUSCULAR; INTRAVENOUS EVERY 30 MIN PRN
Status: DISCONTINUED | OUTPATIENT
Start: 2019-09-10 | End: 2019-09-16 | Stop reason: HOSPADM

## 2019-09-10 RX ORDER — PROPOFOL 10 MG/ML
INJECTION, EMULSION INTRAVENOUS CONTINUOUS PRN
Status: DISCONTINUED | OUTPATIENT
Start: 2019-09-10 | End: 2019-09-10

## 2019-09-10 RX ORDER — OXYCODONE HYDROCHLORIDE 5 MG/1
5 TABLET ORAL EVERY 4 HOURS PRN
Status: DISCONTINUED | OUTPATIENT
Start: 2019-09-10 | End: 2019-09-16 | Stop reason: HOSPADM

## 2019-09-10 RX ORDER — KETOROLAC TROMETHAMINE 30 MG/ML
INJECTION, SOLUTION INTRAMUSCULAR; INTRAVENOUS PRN
Status: DISCONTINUED | OUTPATIENT
Start: 2019-09-10 | End: 2019-09-10

## 2019-09-10 RX ORDER — PROPOFOL 10 MG/ML
INJECTION, EMULSION INTRAVENOUS PRN
Status: DISCONTINUED | OUTPATIENT
Start: 2019-09-10 | End: 2019-09-10

## 2019-09-10 RX ORDER — GLYCOPYRROLATE 0.2 MG/ML
INJECTION, SOLUTION INTRAMUSCULAR; INTRAVENOUS PRN
Status: DISCONTINUED | OUTPATIENT
Start: 2019-09-10 | End: 2019-09-10

## 2019-09-10 RX ADMIN — PROPOFOL 10 MG: 10 INJECTION, EMULSION INTRAVENOUS at 08:39

## 2019-09-10 RX ADMIN — LIDOCAINE HYDROCHLORIDE 3 ML: 20 INJECTION, SOLUTION INFILTRATION; PERINEURAL at 08:26

## 2019-09-10 RX ADMIN — KETAMINE HYDROCHLORIDE 10 MG: 10 INJECTION, SOLUTION INTRAMUSCULAR; INTRAVENOUS at 08:53

## 2019-09-10 RX ADMIN — FENTANYL CITRATE 25 MCG: 50 INJECTION, SOLUTION INTRAMUSCULAR; INTRAVENOUS at 09:05

## 2019-09-10 RX ADMIN — FENTANYL CITRATE 25 MCG: 50 INJECTION, SOLUTION INTRAMUSCULAR; INTRAVENOUS at 09:07

## 2019-09-10 RX ADMIN — HYDROMORPHONE HYDROCHLORIDE 0.5 MG: 1 INJECTION, SOLUTION INTRAMUSCULAR; INTRAVENOUS; SUBCUTANEOUS at 09:32

## 2019-09-10 RX ADMIN — DEXAMETHASONE SODIUM PHOSPHATE 8 MG: 4 INJECTION, SOLUTION INTRAMUSCULAR; INTRAVENOUS at 08:36

## 2019-09-10 RX ADMIN — KETAMINE HYDROCHLORIDE 10 MG: 10 INJECTION, SOLUTION INTRAMUSCULAR; INTRAVENOUS at 08:50

## 2019-09-10 RX ADMIN — PROPOFOL 10 MG: 10 INJECTION, EMULSION INTRAVENOUS at 08:32

## 2019-09-10 RX ADMIN — KETAMINE HYDROCHLORIDE 30 MG: 10 INJECTION, SOLUTION INTRAMUSCULAR; INTRAVENOUS at 08:59

## 2019-09-10 RX ADMIN — ACETAMINOPHEN 975 MG: 325 TABLET, FILM COATED ORAL at 07:20

## 2019-09-10 RX ADMIN — FENTANYL CITRATE 25 MCG: 50 INJECTION, SOLUTION INTRAMUSCULAR; INTRAVENOUS at 09:08

## 2019-09-10 RX ADMIN — ONDANSETRON 4 MG: 2 INJECTION INTRAMUSCULAR; INTRAVENOUS at 08:36

## 2019-09-10 RX ADMIN — PROPOFOL 10 MG: 10 INJECTION, EMULSION INTRAVENOUS at 08:41

## 2019-09-10 RX ADMIN — PROPOFOL 10 MG: 10 INJECTION, EMULSION INTRAVENOUS at 08:36

## 2019-09-10 RX ADMIN — KETOROLAC TROMETHAMINE 30 MG: 30 INJECTION, SOLUTION INTRAMUSCULAR at 09:11

## 2019-09-10 RX ADMIN — GLYCOPYRROLATE 0.4 MG: 0.2 INJECTION, SOLUTION INTRAMUSCULAR; INTRAVENOUS at 08:51

## 2019-09-10 RX ADMIN — MIDAZOLAM 2 MG: 1 INJECTION INTRAMUSCULAR; INTRAVENOUS at 08:21

## 2019-09-10 RX ADMIN — PROPOFOL 50 MG: 10 INJECTION, EMULSION INTRAVENOUS at 08:29

## 2019-09-10 RX ADMIN — LIDOCAINE HYDROCHLORIDE 2 ML: 20 INJECTION, SOLUTION INFILTRATION; PERINEURAL at 08:49

## 2019-09-10 RX ADMIN — OXYCODONE HYDROCHLORIDE 5 MG: 5 TABLET ORAL at 10:18

## 2019-09-10 RX ADMIN — PROPOFOL 75 MCG/KG/MIN: 10 INJECTION, EMULSION INTRAVENOUS at 08:29

## 2019-09-10 RX ADMIN — PROPOFOL 10 MG: 10 INJECTION, EMULSION INTRAVENOUS at 08:40

## 2019-09-10 RX ADMIN — FENTANYL CITRATE 25 MCG: 50 INJECTION, SOLUTION INTRAMUSCULAR; INTRAVENOUS at 09:10

## 2019-09-10 ASSESSMENT — MIFFLIN-ST. JEOR: SCORE: 1563

## 2019-09-10 NOTE — DISCHARGE INSTRUCTIONS
[facility]   Adult Discharge Orders & Instructions     For 24 hours after surgery    1. Get plenty of rest.  A responsible adult must stay with you for at least 24 hours after you leave the hospital.   2. Do not drive or use heavy equipment.  If you have weakness or tingling, don't drive or use heavy equipment until this feeling goes away.  3. Do not drink alcohol.  4. Avoid strenuous or risky activities.  Ask for help when climbing stairs.   5. You may feel lightheaded.  IF so, sit for a few minutes before standing.  Have someone help you get up.   6. If you have nausea (feel sick to your stomach): Drink only clear liquids such as apple juice, ginger ale, broth or 7-Up.  Rest may also help.  Be sure to drink enough fluids.  Move to a regular diet as you feel able.  7. You may have a slight fever. Call the doctor if your fever is over 100 F (37.7 C) (taken under the tongue) or lasts longer than 24 hours.  8. You may have a dry mouth, a sore throat, muscle aches or trouble sleeping.  These should go away after 24 hours.  9. Do not make important or legal decisions.   Call your doctor for any of the followin.  Signs of infection (fever, growing tenderness at the surgery site, a large amount of drainage or bleeding, severe pain, foul-smelling drainage, redness, swelling).    2. It has been over 8 to 10 hours since surgery and you are still not able to urinate (pass water).    3.  Headache for over 24 hours.    4.  Numbness, tingling or weakness the day after surgery (if you had spinal anesthesia).  To contact a doctor, call ________________________________________    What happens after hysteroscopy?  You may have cramps and bleeding for 24 hours after the procedure. This is normal. Use pads instead of tampons.  Do not douche or use tampons until your health care provider says it s OK.  Do not use any vaginal medicines until you are told it s OK.  Ask your health care provider when it s OK to have  sex again.  When should I call my health care provider?  Call your health care provider if you have:  Heavy bleeding (more than 1 pad an hour for 2 or more hours)  A fever over 100.4 F (38.0 C)  Increasing abdominal pain or tenderness  Foul-smelling discharge  Follow-up care  Schedule a follow-up visit with your health care provider. Based on the results of your test, you may need more treatment. Be sure to follow instructions and keep your appointments.      1090-3891 [employer]. [address] [place] [address]. All rights reserved. This information is not intended as a substitute for professional medical care. Always follow your healthcare professional's instructions.

## 2019-09-10 NOTE — ANESTHESIA CARE TRANSFER NOTE
Patient: Richelle Laura    Procedure(s):  Operative Hysteroscopy, With Morcellator    Diagnosis: Irregular Bleeding, Possible Endometrial Polyps  Diagnosis Additional Information: No value filed.    Anesthesia Type:   MAC     Note:  Airway :Nasal Cannula  Patient transferred to:Phase II  Comments: Patient is Awake, VSS, following commands. Patient is breathing Spontaneously with nasal cannula . No obvious complications from anesthesia. Care report given to PACU RN, and notified of assigned Anesthesiology staff to patient for continuity of PACU care.     Randall Ramirez DO.  Anesthesiology Resident CA-1, PGY-2  Pager [phone]  Handoff Report: Identifed the Patient, Identified the Reponsible Provider, Reviewed the pertinent medical history, Discussed the surgical course, Reviewed Intra-OP anesthesia mangement and issues during anesthesia, Set expectations for post-procedure period and Allowed opportunity for questions and acknowledgement of understanding      Vitals: (Last set prior to Anesthesia Care Transfer)    CRNA VITALS  9/10/2019 0848 - 9/10/2019 0925      9/10/2019             Pulse:  101    SpO2:  98 %                Electronically Signed By: Randall Ramirez DO  September 10, 2019  9:25 AM

## 2019-09-10 NOTE — BRIEF OP NOTE
[facility] OB/GYN Brief Operative Note    Pre-operative diagnosis: Post-menopausal bleeding, possible endometrial polyps   Post-operative diagnosis: Post-menopausal bleeding, endometrial polyp   Procedure: Procedure(s):  Operative Hysteroscopy, With Morcellator  Dilation and curettage      Surgeon: Dr. Christi Maurer   Assistant(s): Rashad Madrid MD PGY-1  Laurel Mcarthur, MS3        Anesthesia: MAC and Local anesthesia     Estimated blood loss: 5 mL   Total IV fluids: 1000 mL, crystalloid   Blood transfusion: No transfusion was given during surgery   Total urine output: Voided prior to procedure    Drains: None   Specimens: Endometrial curetting, endometrial polyp   Implants: None   Complications: None apparent    Condition: Patient taken to recovery in stable condition.     Findings: Normal appearing vulva without lesions. On exam under anesthesia, uterus mid-position to anteverted. Uterus sounded to 7 cm. Normal appearing vaginal mucosa. Normal appearing parous cervix. Small sessile endometrial polyp in posterior right lower uterine segment.  Thin atrophic appearing endometrium. Patent tubal ostia bilaterally. Fluid deficit 320 ml.     Rashad Madrid MD   OB/GYN Resident PGY1  9/10/2019 9:25 AM  [facility]

## 2019-09-10 NOTE — ANESTHESIA POSTPROCEDURE EVALUATION
Anesthesia POST Procedure Evaluation    Patient: Richelle Laura   MRN:     [id number] Gender:   female   Age:    49 year old :      1970        Preoperative Diagnosis: Irregular Bleeding, Possible Endometrial Polyps   Procedure(s):  Operative Hysteroscopy, With Morcellator   Postop Comments: No value filed.       Anesthesia Type:  Not documented  MAC    Reportable Event: NO     PAIN: Uncomplicated   Sign Out status: Comfortable, Well controlled pain     PONV: No PONV   Sign Out status:  No Nausea or Vomiting     Neuro/Psych: Uneventful perioperative course   Sign Out Status: Preoperative baseline; Age appropriate mentation     Airway/Resp.: Uneventful perioperative course   Sign Out Status: Non labored breathing, age appropriate RR; Resp. Status within EXPECTED Parameters     CV: Uneventful perioperative course   Sign Out status: Appropriate BP and perfusion indices; Appropriate HR/Rhythm     Disposition:   Sign Out in:  PACU  Disposition:  Phase II; Home  Recovery Course: Uneventful  Follow-Up: Not required           Last Anesthesia Record Vitals:  CRNA VITALS  9/10/2019 0848 - 9/10/2019 0931      9/10/2019             Pulse:  101    SpO2:  98 %          Last PACU Vitals:  Vitals Value Taken Time   BP     Temp     Pulse     Resp     SpO2     Temp src     NIBP 124/74 9/10/2019  9:15 AM   Pulse 101 9/10/2019  9:16 AM   SpO2 98 % 9/10/2019  9:16 AM   Resp     Temp     Ht Rate 93 9/10/2019  9:15 AM   Temp 2           Electronically Signed By: Tee Keane MD, September 10, 2019, 9:31 AM

## 2019-09-10 NOTE — OP NOTE
Patient: Richelle Laura  : 1970  MRN: [id number]    Date of Service: 9/10/2019    Pre-operative diagnosis:  Post-menopausal bleeding, possible endometrial polyps on ultrasound    Post-operative diagnosis:  Post-menopausal bleeding, endometrial polyp s/p removal by procedures below    Procedure: Exam under anesthesia, Operative hysteroscopy with Myosure morcellation of endometrial polyp, dilation and curettage     Surgeon: Dr. Christi Maurer  Assistants:   Rashad Madrid MD PGY1  Laurel Mcarthur, MS3    Anesthesia: MAC and local    EBL: 5 mL  Urine: patient voided prior to procedure  IV Fluids: 1000 mL crystalloid  Fluid deficit: 320 mL normal saline    Specimens: endometrial curretings/endometrial polyp    Complications: none apparent    Findings: EUA revealed midposition to anteverted uterus, approximately 6 cm in size, mobile with no adnexal masses. External genitalia normal, well rugated vagina. Cervix closed. Dilated to 6.5 mm with sequential dilators. Hysteroscopic exam revealed small sessile endometrial polyp in the posterior right lateral lower uterine segment. Otherwise normal endometrial and cervical canal. Bilateral ostia visualized and normal. Fluid deficit 320 mL normal saline. Uterine wall gritty on sharp curettage. Tenaculum sites hemostatic after application of silver nitrate at end of case.     Indications: Richelle Laura is a 49 year old female who presented with post-menopausal bleeding with TVUS concerning for possible endometrial polyp. Risks, benefits, and alternatives to the procedure were discussed. The patient's questions were answered, understanding confirmed, and the patient signed written informed consent.    Technique: The patient was taken to the operating room where SCDs were placed prior to anesthesia. She was placed under MAC anesthesia.  After the patient was comfortable, she was placed in the dorsal lithotomy position with feet in stirrups.  An exam under anesthesia was  performed with findings as noted above. The patient was prepped and draped in the usual sterile fashion. An appropriate patient safety Time Out was conducted.  A speculum was placed in the vagina and the cervix visualized. A single-toothed tenaculum was placed on the anterior lip of the cervix at 12 o'clock after infiltrating with 1mL 1% lidocaine.  A paracervical block was placed at 4 and 8-o'clock with the same local anesthetic; for total of 10 mL used.  The cervical os was carefully dilated to 6.5 mm with sequential Hegar dilators without difficulty. The operating hysteroscope was placed through the cervix with outflow turned on to achieve hydrodilation of the cervix while entering into the uterine cavity. Examination of the uterine cavity demonstrated small sessile endometrial polyp in the posterior right lateral lower uterine segment with otherwise atrophic, thin, normal appearing endometrium. The remainder of the cavity was unremarkable. The hysteroscopic morcellator was then introduced and used to remove the the polyp with good success. The hysteroscope apparatus was removed and total of 320 mL fluid deficit of normal saline noted. A sharp curette was used to clear the uterus until all aspects of endometrium were noted to be gritty with minimal return of tissue. The curettings were sent to pathology. The tenaculum was removed from the cervix and the right tenaculum site was noted to be bleeding. Good hemostasis was noted after application of silver nitrate. The speculum was removed from the vagina.     Instrument, needle and sponge counts were correct x2. Dr. Maurer was present and scrubbed for the entire procedure. The patient tolerated the procedure well. The patient was awoken in the OR and transferred to the PACU in stable condition.     Rashad Madrid MD  Ob/Gyn Resident Physician, PGY-1  09/10/19 10:13 AM    I was scrubbed and present for the entire procedure.  I have reviewed and edited the above  note.    Christi Maurer MD, FACOG

## 2019-10-21 LAB — COPATH REPORT: NORMAL

## 2019-11-08 ENCOUNTER — HEALTH MAINTENANCE LETTER (OUTPATIENT)
Age: 49
End: 2019-11-08

## 2020-03-03 DIAGNOSIS — Z78.0 MENOPAUSE: ICD-10-CM

## 2020-03-03 RX ORDER — ESTRADIOL 0.04 MG/D
1 PATCH, EXTENDED RELEASE TRANSDERMAL
Qty: 8 PATCH | Refills: 0 | Status: SHIPPED | OUTPATIENT
Start: 2020-03-05 | End: 2020-03-06

## 2020-03-03 NOTE — TELEPHONE ENCOUNTER
Received refill request for estradiol patch.  Last in clinic 8/2019.      Has annual scheduled 3/2020.  Refilled x 30 days

## 2020-03-06 ENCOUNTER — OFFICE VISIT (OUTPATIENT)
Dept: FAMILY MEDICINE | Facility: CLINIC | Age: 50
End: 2020-03-06
Attending: FAMILY MEDICINE
Payer: COMMERCIAL

## 2020-03-06 VITALS
SYSTOLIC BLOOD PRESSURE: 128 MMHG | DIASTOLIC BLOOD PRESSURE: 91 MMHG | HEIGHT: 70 IN | BODY MASS INDEX: 27.56 KG/M2 | HEART RATE: 73 BPM | WEIGHT: 192.5 LBS

## 2020-03-06 DIAGNOSIS — Z78.0 MENOPAUSE: ICD-10-CM

## 2020-03-06 DIAGNOSIS — Z84.81 FAMILY HISTORY OF BRCA GENE MUTATION: ICD-10-CM

## 2020-03-06 DIAGNOSIS — Z12.39 SCREENING FOR BREAST CANCER: Primary | ICD-10-CM

## 2020-03-06 PROCEDURE — G0463 HOSPITAL OUTPT CLINIC VISIT: HCPCS | Mod: ZF

## 2020-03-06 RX ORDER — ESTRADIOL 0.04 MG/D
1 PATCH, EXTENDED RELEASE TRANSDERMAL
Qty: 8 PATCH | Refills: 3 | Status: SHIPPED | OUTPATIENT
Start: 2020-03-09 | End: 2020-07-27

## 2020-03-06 ASSESSMENT — ANXIETY QUESTIONNAIRES
6. BECOMING EASILY ANNOYED OR IRRITABLE: NOT AT ALL
7. FEELING AFRAID AS IF SOMETHING AWFUL MIGHT HAPPEN: NOT AT ALL
GAD7 TOTAL SCORE: 2
2. NOT BEING ABLE TO STOP OR CONTROL WORRYING: NOT AT ALL
1. FEELING NERVOUS, ANXIOUS, OR ON EDGE: SEVERAL DAYS
5. BEING SO RESTLESS THAT IT IS HARD TO SIT STILL: NOT AT ALL
3. WORRYING TOO MUCH ABOUT DIFFERENT THINGS: NOT AT ALL

## 2020-03-06 ASSESSMENT — ENCOUNTER SYMPTOMS
SENSORY CHANGE: 0
PALPITATIONS: 0
FEVER: 0
NERVOUS/ANXIOUS: 1
COUGH: 0
FOCAL WEAKNESS: 0
DIZZINESS: 0
HEADACHES: 0
DOUBLE VISION: 0
CHILLS: 0
SHORTNESS OF BREATH: 0

## 2020-03-06 ASSESSMENT — MIFFLIN-ST. JEOR: SCORE: 1578.42

## 2020-03-06 ASSESSMENT — PATIENT HEALTH QUESTIONNAIRE - PHQ9: 5. POOR APPETITE OR OVEREATING: SEVERAL DAYS

## 2020-03-06 NOTE — LETTER
3/6/2020       RE: Richelle Laura  [address]  [facility] [address]     Dear Colleague,    Thank you for referring your patient, Richelle Laura, to the [facility] at [facility]. Please see a copy of my visit note below.    HPI  Transfer patient of Dr. Luther, wishes to focus on HRT today, and will do preventive care visit in future    1. Menopause--  Natual onset Age 46;  living in [place] at time. Had severe hot  Flashes, night sweats, palipitations, gained 30#. Had severe athralgia and stiffness in knee and back. Started on compounded HRT from physician in [place], helpful.  Moved to [place] age 47, went to [facility], was taken off HRT, all symptoms came back. Off hormones x 1 year, then came to see Dr. Luther, had negative evaluation for connective tissue disease, and restarted HRT with TD estradiol and micronized progesterone, symptoms improved.     Last saw Dr. Luther 2019, doing well at that time, except abnormal bleeding.  Had US show polyp and increased lining, had hysteroscopy and polyp removed, pathology normal. No bleeding since.    Has been out of estradiol x 1 week, not out of progesterone  Continues on Vivelle dot 0.0375mg 2x/wk, prometrium 100 mg daily.  No hot flashes, no night sweats except if heavy meal    2. High Risk Breast Cancer/Family history BRCA  Last mammogram:   Mother  before could be tested  Mat. aunt BRCA 1, had hystectomy preventive, had breast cancer  Mat.  Grandfather BRCA 1  Mat grandmother d. Breast CA  No ovarian, uterine ca    Patient Active Problem List    Diagnosis Date Noted     Pre-operative examination 2019     Priority: Medium     Migraine with aura 2007     Priority: Medium     Rare migraines, few times/year, occular aura rarely      CAD risk:  Pat grandfther cad  Mat.  grandfather--CVA  Normal glucose   High cholesterol , HDL 47     smoker 3-4cigs/day  Had been quit  "x 9 years      2.  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 Pain is aching, throbbing.   Past inflammatory markers negative      Current Outpatient Medications   Medication     Nutritional Supplements (NUTRITIONAL SUPPLEMENT PLUS) LIQD     PROGESTERONE 100MG CAPSULES COMPOUND     temazepam (RESTORIL) 15 MG capsule     acetaminophen (TYLENOL) 325 MG tablet     dihydroergotamine (MIGRANAL) 4 MG/ML nasal spray     eletriptan (RELPAX) 20 MG tablet     estradiol (VIVELLE-DOT) 0.0375 MG/24HR BIW patch     ibuprofen (ADVIL/MOTRIN) 600 MG tablet     No current facility-administered medications for this visit.      Strongely recommend cessation, techniques reviewed      Review of Systems   Constitutional: Negative for chills and fever.   Eyes: Negative for double vision.   Respiratory: Negative for cough and shortness of breath.    Cardiovascular: Negative for chest pain and palpitations.   Neurological: Negative for dizziness, sensory change, focal weakness and headaches.   Psychiatric/Behavioral: The patient is nervous/anxious.          Physical Exam  Constitutional:       Appearance: Normal appearance.   Neck:      Musculoskeletal: Neck supple.      Vascular: No carotid bruit.   Cardiovascular:      Rate and Rhythm: Normal rate and regular rhythm.      Heart sounds: Normal heart sounds.   Pulmonary:      Effort: Pulmonary effort is normal.      Breath sounds: Normal breath sounds.   Lymphadenopathy:      Cervical: Cervical adenopathy present.   Neurological:      Mental Status: She is alert.   Psychiatric:      Comments: Anxious, but otherrwise normal       Blood pressure (!) 128/91, pulse 73, height 1.778 m (5' 10\"), weight 87.3 kg (192 lb 8 oz).    A/P  1. HRT  Counseled patient she is at high risk for breast cancer and prolonged HRT " increases risk further.  Will refer to genetic counseling and resolve BRCA/breast cancer risk status, clarify recommendations for screening; need to do so in order to continue HRT  Dicussed that iiF BRCA or other genetic risk for breast cancer, or wishes to reduce risk long term use--counseled non estrogen methods and medications for treating menopausal symptoms     2. Smoking  Smoking cessation strongly recommended, especially in light of HRT  3. Myalgias/athralgias  Discussed if athralgias increase frequency, persistence can consider referral to Ortho or rheumatology    Follow-up after genetic counseling for CPE    Total time spent face to face with the patient was 40 minutes. More than 50% of the time spent with the patient involved counseling and/or coordinating care.on the issues documented above. Other items discussed included, but were not limited to, prognosis, differential diagnosis, risks/benefits of treatment, instructions regarding medication and importance of compliance, risk reduction, as well as discussion and coordination of care with other health care providers and patient s care giver.    Again, thank you for allowing me to participate in the care of your patient.      Sincerely,    Connor Wallace MD

## 2020-03-06 NOTE — PROGRESS NOTES
HPI  Transfer patient of Dr. Luther, wishes to focus on HRT today, and will do preventive care visit in future    1. Menopause--  Natual onset Age 46;  living in [place] at time. Had severe hot  Flashes, night sweats, palipitations, gained 30#. Had severe athralgia and stiffness in knee and back. Started on compounded HRT from physician in [place], helpful.  Moved to [place] age 47, went to [facility], was taken off HRT, all symptoms came back. Off hormones x 1 year, then came to see Dr. Luther, had negative evaluation for connective tissue disease, and restarted HRT with TD estradiol and micronized progesterone, symptoms improved.     Last saw Dr. Luther 2019, doing well at that time, except abnormal bleeding.  Had US show polyp and increased lining, had hysteroscopy and polyp removed, pathology normal. No bleeding since.    Has been out of estradiol x 1 week, not out of progesterone  Continues on Vivelle dot 0.0375mg 2x/wk, prometrium 100 mg daily.  No hot flashes, no night sweats except if heavy meal    2. High Risk Breast Cancer/Family history BRCA  Last mammogram:   Mother  before could be tested  Mat. aunt BRCA 1, had hystectomy preventive, had breast cancer  Mat.  Grandfather BRCA 1  Mat grandmother d. Breast CA  No ovarian, uterine ca    Patient Active Problem List    Diagnosis Date Noted     Pre-operative examination 2019     Priority: Medium     Migraine with aura 2007     Priority: Medium     Rare migraines, few times/year, occular aura rarely      CAD risk:  Pat grandfther cad  Mat.  grandfather--CVA  Normal glucose   High cholesterol , HDL 47     smoker 3-4cigs/day  Had been quit x 9 years      2.  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 Pain is aching, throbbing.   Past  "inflammatory markers negative      Current Outpatient Medications   Medication     Nutritional Supplements (NUTRITIONAL SUPPLEMENT PLUS) LIQD     PROGESTERONE 100MG CAPSULES COMPOUND     temazepam (RESTORIL) 15 MG capsule     acetaminophen (TYLENOL) 325 MG tablet     dihydroergotamine (MIGRANAL) 4 MG/ML nasal spray     eletriptan (RELPAX) 20 MG tablet     estradiol (VIVELLE-DOT) 0.0375 MG/24HR BIW patch     ibuprofen (ADVIL/MOTRIN) 600 MG tablet     No current facility-administered medications for this visit.      Strongely recommend cessation, techniques reviewed      Review of Systems   Constitutional: Negative for chills and fever.   Eyes: Negative for double vision.   Respiratory: Negative for cough and shortness of breath.    Cardiovascular: Negative for chest pain and palpitations.   Neurological: Negative for dizziness, sensory change, focal weakness and headaches.   Psychiatric/Behavioral: The patient is nervous/anxious.          Physical Exam  Constitutional:       Appearance: Normal appearance.   Neck:      Musculoskeletal: Neck supple.      Vascular: No carotid bruit.   Cardiovascular:      Rate and Rhythm: Normal rate and regular rhythm.      Heart sounds: Normal heart sounds.   Pulmonary:      Effort: Pulmonary effort is normal.      Breath sounds: Normal breath sounds.   Lymphadenopathy:      Cervical: Cervical adenopathy present.   Neurological:      Mental Status: She is alert.   Psychiatric:      Comments: Anxious, but otherrwise normal       Blood pressure (!) 128/91, pulse 73, height 1.778 m (5' 10\"), weight 87.3 kg (192 lb 8 oz).    A/P  1. HRT  Counseled patient she is at high risk for breast cancer and prolonged HRT increases risk further.  Will refer to genetic counseling and resolve BRCA/breast cancer risk status, clarify recommendations for screening; need to do so in order to continue HRT  Dicussed that iiF BRCA or other genetic risk for breast cancer, or wishes to reduce risk long term " use--counseled non estrogen methods and medications for treating menopausal symptoms     2. Smoking  Smoking cessation strongly recommended, especially in light of HRT  3. Myalgias/athralgias  Discussed if athralgias increase frequency, persistence can consider referral to Ortho or rheumatology    Follow-up after genetic counseling for CPE    Total time spent face to face with the patient was 40 minutes. More than 50% of the time spent with the patient involved counseling and/or coordinating care.on the issues documented above. Other items discussed included, but were not limited to, prognosis, differential diagnosis, risks/benefits of treatment, instructions regarding medication and importance of compliance, risk reduction, as well as discussion and coordination of care with other health care providers and patient s care giver.

## 2020-03-07 ASSESSMENT — ANXIETY QUESTIONNAIRES: GAD7 TOTAL SCORE: 2

## 2020-03-10 ENCOUNTER — TELEPHONE (OUTPATIENT)
Dept: OTHER | Facility: OUTPATIENT CENTER | Age: 50
End: 2020-03-10

## 2020-03-10 NOTE — TELEPHONE ENCOUNTER
Oncology/Surgical Oncology Referral Request:     Specialty Requested: Medical Oncology - Cancer Risk/Genetics    Referring Provider: Connor Faye MD    Referring Clinic/Organization: [facility]    Records location: [facility]     Requested Provider (if specified): Not Specified      LVM an Letter Sent

## 2020-03-10 NOTE — TELEPHONE ENCOUNTER
ONCOLOGY INTAKE: Records Information      APPT INFORMATION:  Referring provider:  Connor Wallace   Referring provider s clinic:  [facility]   Reason for visit/diagnosis:  Family history of breast cancer, BRCA gene mutation   Has patient been notified of appointment date and time?: Yes     RECORDS INFORMATION:  Were the records received with the referral (via [employer])? Internal referral     Has patient been seen for any external appt for this diagnosis? No    If yes, where? NA    Has patient had any imaging or procedures outside of [facility] for this condition? No      If Yes, where? NA    ADDITIONAL INFORMATION:  None

## 2020-03-24 NOTE — PROGRESS NOTES
3/25/2020    Referring Provider: Connor Wallace MD    Presenting Information:   I spoke to Richelle Laura today for genetic counseling as part if the Cancer Risk Management Program (over phone due to COVID-19) to discuss her family history of pancreatic and breast cancer, including a previously identified BRCA1 mutation in the family.  We reviewed this history and available genetic testing options.      Personal History:  Richelle is a 50 year old female. She does not have any personal history of cancer.  She had her first menstrual period at age 14, her first child at age 22, and is postmenopause (age 46).  Rihcelle has her ovaries, fallopian tubes and uterus in place.  She is currently taking hormone replacement therapy.  Her most recent mammogram from 3/21/2019 was negative, and she has heterogeneously dense breasts.  Transvaginal ultrasound was completed 8/15/2019 which noted two endometrial polyps.  She has not started colonoscopy screening.      Family History: (Please see scanned pedigree for detailed family history information)    Richelle's mother was diagnosed with pancreatic cancer and passed away at age 63.  She did not complete genetic testing    Her maternal aunt was diagnosed with breast cancer at age 32 and was found to carry a BRCA1 gene mutation.  She had a complete hysterectomy    Her maternal grandmother was diagnosed with breast cancer at age 47 and passed away at age 57.  No genetic testing was completed    Her maternal grandfather has a history of skin cancer and was found to carry a BRCA1 gene mutation.      One paternal uncle was diagnosed with prostate cancer in his early 70's    Her maternal ethnicity is Ashkenazi Mandaeism (through her grandfather) and Kyrgyz. Her paternal ethnicity is Ashkenazi Mandaeism/Hungarian/Kyrgyz.      Discussion:    Richelle has a family history of breast and pancreatic cancer with an identified BRCA1 gene mutation.   We discussed the impact of this family history and testing on Richelle and  her family in detail.      We discussed the natural history and genetics of Hereditary Breast and Ovarian Cancer syndrome caused by mutations in the BRCA1 gene. A detailed handout regarding this cancer syndrome and the information we discussed can be found in the after visit summary.  Topics included: inheritance pattern, cancer risks, cancer screening recommendations, and also risks, benefits and limitations of testing.    We reviewed that mutations in the BRCA1 gene are inherited in an autosomal dominant pattern.  As her mother did not complete genetic testing, Richelle currently has a 25% chance of inheriting the same mutation which was identified in her maternal grandfather and aunt.      Given her family history of pancreatic and breast cancer in relatives who have not completed genetic testing, we discussed that there are additional genes that could cause increased risk for these cancers. As many of these genes present with overlapping features in a family and accurate cancer risk cannot always be established based upon the pedigree analysis alone, it would be reasonable for Richelle to consider panel genetic testing to analyze multiple genes at once. As she has Ashkenazi Religion ancestry on both sides of her family, we also discussed that there are three mutations in the BRCA genes that are more common. About 1 in 40 individuals of Ashkenazi Religion background have one of these three mutations.  Based on her family history, Richelle meets current National Comprehensive Cancer Network (NCCN) guidelines for germline genetic testing.  Specifically, these guidelines support testing of the following genes in addition to the known BRCA1 mutation in the family: BRCA1, BRCA2, TELLO, CDKN2A, most Good syndrome genes (MLH1, MSH2, MSH6, EPCAM), PALB2, STK11 and TP53.       Genetic testing is available for the BRCA1 gene mutation identified in Richelle's maternal relatives.  As her mother had a history of pancreatic cancer and did not  complete testing, and she has a family history of early onset breast cancer (including her maternal grandmother, who did not carry the known familial mutation) and paternal family history of Ashkenazi Yarsani ancestry, it would be appropriate to consider expanded testing to include addition genes related to hereditary breast and pancreatic cancer.  We reviewed available options, including a panel of high-moderate risk genes which have published management guidelines, as well as expanded panel options.  Richelle elected to proceed with the expanded testing today  Genetic testing is available for 34 genes associated with hereditary breast, gynecologic, gastrointestinal (including pancreatic) and related cancers: CancerNext (APC, TELLO, BARD1, BMPR1A, BRCA1, BRCA2, BRIP1, CDH1, CDK4, CDKN2A, CHEK2, DICER1, EPCAM, GREM1, HOXB13, MLH1, MRE11A, MSH2, MSH6, MUTYH, NBN, NF1, PALB2, PMS2, POLD1, POLE, PTEN, RAD50, RAD51C, RAD51D, SMAD4, SMARCA4, STK11, and TP53).  We discussed that many of the genes in the CancerNext panel are associated with specific hereditary cancer syndromes and published management guidelines: Hereditary Breast and Ovarian Cancer syndrome (BRCA1, BRCA2), Good syndrome (MLH1, MSH2, MSH6, PMS2, EPCAM), Familial Adenomatous Polyposis (APC), Hereditary Diffuse Gastric Cancer (CDH1), Familial Atypical Multiple Mole Melanoma syndrome (CDK4, CDKN2A), Juvenile Polyposis syndrome (BMPR1A, SMAD4), Cowden syndrome (PTEN), Li Fraumeni syndrome (TP53), Peutz-Jeghers syndrome (STK11), MUTYH Associated Polyposis (MUTYH), and Neurofibromatosis type 1 (NF1).   The TELLO, BRIP1, CHEK2, GREM1, NBN, PALB2, POLD1, POLE, RAD51C, and RAD51D genes are associated with increased cancer risk and have published management guidelines for certain cancers.    The remaining genes (BARD1, DICER1, HOXB13, MRE11A, RAD50, and SMARCA4) are associated with increased cancer risk and may allow us to make medical recommendations when mutations are  identified.       Consent was obtained over phone (due to COVID-19, no witness required) and genetic testing for the CancerNext panel will be sent to [facility].  We reviewed sample collection options currently available, including scheduling a lab visit at the [facility], postponing testing, or requesting a saliva kit be mailed by the laboratory.  A saliva kit will be mailed to Richelle by [facility] for sample collection, per Richelle's request. She is encouraged to provide a copy of her maternal aunt and grandfather's test results, if obtained.       The information from genetic testing may determine additional cancer screening recommendations as well as options for risk reducing surgeries for Richelle and her relatives, including current NCCN guidelines for women who carry a BRCA1 mutation.   These recommendations will be discussed in detail once genetic testing is completed.    Plan:  1) Today Richelle elected to proceed with the CancerNext panel offered through [facility]  2) This information should be available in 3-4 weeks, once sample is collected and insurance authorization is obtained.  3) Richelle will schedule a return visit to discuss the results.    Time spent over phone: 45 minutes (visit completed over phone due to COVID-19 concerns)    Kina Terry MS, [facility]  Licensed, Certified Genetic Counselor  [facility]  Phone: [phone]

## 2020-03-25 ENCOUNTER — VIRTUAL VISIT (OUTPATIENT)
Dept: ONCOLOGY | Facility: CLINIC | Age: 50
End: 2020-03-25
Attending: FAMILY MEDICINE
Payer: COMMERCIAL

## 2020-03-25 ENCOUNTER — PRE VISIT (OUTPATIENT)
Dept: ONCOLOGY | Facility: CLINIC | Age: 50
End: 2020-03-25

## 2020-03-25 DIAGNOSIS — Z80.3 FAMILY HISTORY OF MALIGNANT NEOPLASM OF BREAST: ICD-10-CM

## 2020-03-25 DIAGNOSIS — Z84.81 FAMILY HISTORY OF BRCA1 GENE POSITIVE: Primary | ICD-10-CM

## 2020-03-25 DIAGNOSIS — Z80.42 FAMILY HISTORY OF PROSTATE CANCER: ICD-10-CM

## 2020-03-25 DIAGNOSIS — Z80.0 FAMILY HISTORY OF PANCREATIC CANCER: ICD-10-CM

## 2020-03-25 PROCEDURE — 40000072 ZZH STATISTIC GENETIC COUNSELING, < 16 MIN: Performed by: GENETIC COUNSELOR, MS

## 2020-03-25 PROCEDURE — 96040 ZZH GENETIC COUNSELING, EACH 30 MINUTES: CPT | Mod: TEL,ZF | Performed by: GENETIC COUNSELOR, MS

## 2020-03-25 NOTE — LETTER
Cancer Risk Management  Program Locations    [facility]  [facility]  [facility]  [facility]  Mailing Address  Cancer Risk Management Program  [facility]  [address]  [place] [address]    New patient appointments  [phone]  March 26, 2020    iRchelle Laura  [address] [place] [id number]      Dear Richelle,    It was a pleasure speaking with you on the phone on 3/25/2020. Here is a copy of the progress note from our discussion. If you have any additional questions, please feel free to call.    Referring Provider: Connor Wallace MD    Presenting Information:   I spoke to Richelle Laura today for genetic counseling as part if the Cancer Risk Management Program (over phone due to COVID-19) to discuss her family history of pancreatic and breast cancer, including a previously identified BRCA1 mutation in the family.  We reviewed this history and available genetic testing options.      Personal History:  Richelle is a 50 year old female. She does not have any personal history of cancer.  She had her first menstrual period at age 14, her first child at age 22, and is postmenopause (age 46).  Richelle has her ovaries, fallopian tubes and uterus in place.  She is currently taking hormone replacement therapy.  Her most recent mammogram from 3/21/2019 was negative, and she has heterogeneously dense breasts.  Transvaginal ultrasound was completed 8/15/2019 which noted two endometrial polyps.  She has not started colonoscopy screening.      Family History: (Please see scanned pedigree for detailed family history information)    Richelle's mother was diagnosed with pancreatic cancer and passed away at age 63.  She did not complete genetic testing    Her maternal aunt was diagnosed with breast cancer at age 32 and was found to carry a BRCA1 gene mutation.  She  had a complete hysterectomy    Her maternal grandmother was diagnosed with breast cancer at age 47 and passed away at age 57.  No genetic testing was completed    Her maternal grandfather has a history of skin cancer and was found to carry a BRCA1 gene mutation.      One paternal uncle was diagnosed with prostate cancer in his early 70's    Her maternal ethnicity is Ashkenazi Anabaptism (through her grandfather) and Hebrew. Her paternal ethnicity is Ashkenazi Anabaptism/Zimbabwean/Hebrew.      Discussion:    Richelle has a family history of breast and pancreatic cancer with an identified BRCA1 gene mutation.   We discussed the impact of this family history and testing on Richelle and her family in detail.      We discussed the natural history and genetics of Hereditary Breast and Ovarian Cancer syndrome caused by mutations in the BRCA1 gene. A detailed handout regarding this cancer syndrome and the information we discussed can be found in the after visit summary.  Topics included: inheritance pattern, cancer risks, cancer screening recommendations, and also risks, benefits and limitations of testing.    We reviewed that mutations in the BRCA1 gene are inherited in an autosomal dominant pattern.  As her mother did not complete genetic testing, Richelle currently has a 25% chance of inheriting the same mutation which was identified in her maternal grandfather and aunt.      Given her family history of pancreatic and breast cancer in relatives who have not completed genetic testing, we discussed that there are additional genes that could cause increased risk for these cancers. As many of these genes present with overlapping features in a family and accurate cancer risk cannot always be established based upon the pedigree analysis alone, it would be reasonable for Richelle to consider panel genetic testing to analyze multiple genes at once. As she has Ashkenazi Anabaptism ancestry on both sides of her family, we also discussed that there are three  mutations in the BRCA genes that are more common. About 1 in 40 individuals of Ashkenazi Zoroastrian background have one of these three mutations.  Based on her family history, Richelle meets current National Comprehensive Cancer Network (NCCN) guidelines for germline genetic testing.  Specifically, these guidelines support testing of the following genes in addition to the known BRCA1 mutation in the family: BRCA1, BRCA2, TELLO, CDKN2A, most Good syndrome genes (MLH1, MSH2, MSH6, EPCAM), PALB2, STK11 and TP53.       Genetic testing is available for the BRCA1 gene mutation identified in Richelle's maternal relatives.  As her mother had a history of pancreatic cancer and did not complete testing, and she has a family history of early onset breast cancer (including her maternal grandmother, who did not carry the known familial mutation) and paternal family history of Ashkenazi Zoroastrian ancestry, it would be appropriate to consider expanded testing to include addition genes related to hereditary breast and pancreatic cancer.  We reviewed available options, including a panel of high-moderate risk genes which have published management guidelines, as well as expanded panel options.  Richelle elected to proceed with the expanded testing today  Genetic testing is available for 34 genes associated with hereditary breast, gynecologic, gastrointestinal (including pancreatic) and related cancers: CancerNext (APC, TELLO, BARD1, BMPR1A, BRCA1, BRCA2, BRIP1, CDH1, CDK4, CDKN2A, CHEK2, DICER1, EPCAM, GREM1, HOXB13, MLH1, MRE11A, MSH2, MSH6, MUTYH, NBN, NF1, PALB2, PMS2, POLD1, POLE, PTEN, RAD50, RAD51C, RAD51D, SMAD4, SMARCA4, STK11, and TP53).  We discussed that many of the genes in the CancerNext panel are associated with specific hereditary cancer syndromes and published management guidelines: Hereditary Breast and Ovarian Cancer syndrome (BRCA1, BRCA2), Good syndrome (MLH1, MSH2, MSH6, PMS2, EPCAM), Familial Adenomatous Polyposis (APC), Hereditary  Diffuse Gastric Cancer (CDH1), Familial Atypical Multiple Mole Melanoma syndrome (CDK4, CDKN2A), Juvenile Polyposis syndrome (BMPR1A, SMAD4), Cowden syndrome (PTEN), Li Fraumeni syndrome (TP53), Peutz-Jeghers syndrome (STK11), MUTYH Associated Polyposis (MUTYH), and Neurofibromatosis type 1 (NF1).   The TELLO, BRIP1, CHEK2, GREM1, NBN, PALB2, POLD1, POLE, RAD51C, and RAD51D genes are associated with increased cancer risk and have published management guidelines for certain cancers.    The remaining genes (BARD1, DICER1, HOXB13, MRE11A, RAD50, and SMARCA4) are associated with increased cancer risk and may allow us to make medical recommendations when mutations are identified.       Consent was obtained over phone (due to COVID-19, no witness required) and genetic testing for the CancerNext panel will be sent to [facility].  We reviewed sample collection options currently available, including scheduling a lab visit at the [facility], postponing testing, or requesting a saliva kit be mailed by the laboratory.  A saliva kit will be mailed to Richelle by [facility] for sample collection, per Richelle's request. She is encouraged to provide a copy of her maternal aunt and grandfather's test results, if obtained.       The information from genetic testing may determine additional cancer screening recommendations as well as options for risk reducing surgeries for Richelle and her relatives, including current NCCN guidelines for women who carry a BRCA1 mutation.   These recommendations will be discussed in detail once genetic testing is completed.    Plan:  1) Today Richelle elected to proceed with the CancerNext panel offered through [facility].  2) This information should be available in 3-4 weeks, once sample is collected and insurance authorization is obtained.  3) Richelle will schedule a return visit to discuss the results, once testing is completed.    Kina Terry MS, CGC  Licensed, Certified Genetic  Counselor  [facility]  Phone: [phone]

## 2020-03-25 NOTE — PATIENT INSTRUCTIONS
Assessing Cancer Risk  Only about 5-10% of cancers are thought to be due to an inherited cancer susceptibility gene.    These families often have:    Several people with the same or related types of cancer    Cancers diagnosed at a young age (before age 50)    Individuals with more than one primary cancer    Multiple generations of the family affected with cancer    BRCA1 and BRCA2 Genes  We each inherit two copies of every gene in our bodies: one from our mother, and one from our father.  Each gene has a specific job to do.  When a gene has a mistake or  mutation  in it, it does not work like it should.  Everyone has two copies of BRCA1 and two copies of BRCA2.  A single mutation in one of the copies of BRCA1 or BRCA2 increases the risk for breast and ovarian cancer, among others.  The risk for pancreatic cancer and melanoma may also be slightly increased in some families.  The tables below list the chance that someone with a BRCA mutation would develop cancer in his or her lifetime1,2,3,4.      Women   Men    General Population BRCA +   General Population BRCA +   Breast 12% 40-85%  Breast <1% ~7%   Ovarian 1-2% 10-40%  Prostate 16% 20%     Inheriting a mutation does not mean a person will develop cancer, but it does significantly increase his or her risk above the general population risk.    A person s ethnic background is also important to consider, as individuals of [employer] ancestry have a higher chance of having a BRCA gene mutation.  There are three BRCA mutations that occur more frequently in this population.     Genetic Testing  Genetic testing involves a simple blood test and will look at the genetic information in the BRCA1 and BRCA2 genes for any harmful mutations that are associated with increased cancer risk.  If possible, it is recommended that the person(s) who has had cancer be tested before other family members.  That person will give us the most useful information about whether or not  a specific gene is associated with the cancer in the family.     Results  There are three possible results of BRCA1 and BRCA2 genetic testing:    Positive--a harmful mutation was identified    Negative--no mutation was identified    Variant of unknown significance--a variation in one of the genes was identified, but it is unclear how this impacts cancer risk in the family    Advantages and Disadvantages  There are advantages and disadvantages to genetic testing of these genes.    Advantages    May clarify your cancer risk    Can help you make medical decisions    May explain the cancers in your family    May give useful information to your family members (if you share your results)    Disadvantages    Possible negative emotional impact of learning about inherited cancer risk    Uncertainty in interpreting a negative test result in some situations    Possible genetic discrimination concerns (see below)    Inheritance   BRCA1 and BRCA2 mutations are inherited in an autosomal dominant pattern.  This means that if a parent has a mutation, each of his or her children will have a 50% chance of inheriting that same mutation.  Therefore, each child--male or female--would have a 50% chance of being at increased risk for developing cancer.                                              Image obtained from Genetics Home Reference, 2013     Genetic Information Nondiscrimination Act (WANDA)  WANDA is a federal law that protects individuals from health insurance or employment discrimination based on a genetic test result alone.  Although rare, there are currently no legal protections in terms of life insurance, long term care, or disability insurances.  Visit the National Human Genome Research [place] at Genome.gov/59176913 to learn more.    Reducing Cancer Risk  Current screening recommendations for women with a BRCA mutation include1:    Breast:  o Breast awareness starting at age 18  o Clinical breast exams starting at age 25;  repeated every 6-12 months  o Annual breast MRI starting at age 25 (or possibly younger)  o Annual mammogram (consider tomosynthesis) and breast MRI at age 30 (for women with and without a breast cancer history)  o Consideration of preventative mastectomy    Ovarian:   o Consideration of transvaginal ultrasound and CA-125 blood test starting at age 30 (or possibly younger); repeated every 6 months  o Recommendation for surgery to remove the ovaries and fallopian tubes after child bearing or by age 35-40. Women with BRCA2 mutations may delay this surgery until ages 40-45, unless it is warranted to consider sooner based on family history.    Some data suggests that women with BRCA1 mutations may be at slightly higher risk for serous uterine cancer. Information is limited at this time, and further research is needed to better understand this risk. Women with BRCA1 mutations should discuss the risks and benefits of hysterectomy at the time of ovary removal.     Current screening recommendations for men with a BRCA mutation include1:    Breast:  o Self-breast exams starting at age 35  o Annual clinical breast exams starting at age 35    Prostate:   o Recommend prostate cancer screening starting at age 45 for BRCA2 carriers  o Consider prostate cancer screening starting at age 45 for BRCA1 carriers    Both men and women with BRCA mutations should talk to their doctor or genetic counselor about screening for pancreatic cancer and melanoma.  In addition, the age at which to start screening may be modified based on family history of young cancer.    Questions to Think About Regarding Genetic Testing    What effect will the test result have on me and my relationship with my family members if I have an inherited gene mutation?  If I don t have a gene mutation?    Should I share my test results, and how will my family react to this news, which may also affect them?    Are my children ready to learn new information that may one  day affect their own health?    Resources  FORCE: Facing Our Risk of Cancer Empowered facingourrisk.org   Bright Pink bebrightpink.org   American Cancer Society (ACS) cancer.org   National Cancer [place] (NCI) cancer.gov     Please call us if you have any questions or concerns.   Cancer Risk Management Program [phone]-CANCER ([phone])  ? Ketan Daniel, MS [facility]  [phone]  ? Nikia Otero, MS, [facility]  [phone]  ? Kina Terry, MS, [facility]  [phone] [email or web address]  ? Cindy Devlin, MS, [facility] [phone]  ? Angie Montanez, MS, [facility] [phone]  ? Kary Lott, MS, [facility]  [phone]  ? Yani Serrano, MS, [facility]  [phone]    References:  1. National Comprehensive Cancer Network. Clinical practice guidelines in oncology, colorectal cancer screening. Available online (registration required). 2019.

## 2020-04-30 ENCOUNTER — TELEPHONE (OUTPATIENT)
Dept: ONCOLOGY | Facility: CLINIC | Age: 50
End: 2020-04-30

## 2020-04-30 NOTE — TELEPHONE ENCOUNTER
I called Richelle today regarding the status of her genetic testing.  A telephone visit was completed 3/25/2020, in which Richelle elected to proceed with germline genetic testing through [facility] (please see clinic note for additional details).  Per her request, a saliva kit was mailed directly from [facility] for sample collection for this test (kit sent 3/28/2020).  As her sample has not yet been received by the laboratory, I attempted to reach Richelle today to determine whether she receivd the kit/was planning to still provide a saliva sample for this test.      As Richelle did not answer her phone, I left a non-detailed message with my direct office number, should she have questions.      Kina Terry MS, CGC  Licensed, Certified Genetic Counselor  [facility]  Phone: [phone]

## 2020-07-27 DIAGNOSIS — Z78.0 MENOPAUSE: ICD-10-CM

## 2020-07-28 RX ORDER — ESTRADIOL 0.04 MG/D
1 PATCH, EXTENDED RELEASE TRANSDERMAL
Qty: 8 PATCH | Refills: 1 | Status: SHIPPED | OUTPATIENT
Start: 2020-07-30 | End: 2020-09-21

## 2020-09-21 ENCOUNTER — MYC MEDICAL ADVICE (OUTPATIENT)
Dept: OBGYN | Facility: CLINIC | Age: 50
End: 2020-09-21

## 2020-09-21 DIAGNOSIS — Z78.0 MENOPAUSE: ICD-10-CM

## 2020-09-21 RX ORDER — ESTRADIOL 0.04 MG/D
1 PATCH, EXTENDED RELEASE TRANSDERMAL
Qty: 2 PATCH | Refills: 0 | Status: SHIPPED | OUTPATIENT
Start: 2020-09-21 | End: 2020-09-22

## 2020-09-21 NOTE — TELEPHONE ENCOUNTER
Patient returned call. She states she is switching clinic and has upcoming appointment scheduled. Asks if we could fill to appointment when she can then have this transferred to new provider.    One-week supply refilled to get to 9/28 visit.

## 2020-09-21 NOTE — TELEPHONE ENCOUNTER
Received refill request for estradiol patch.  Last in clinic 3/2020 with Dr Wallace where it was recommended that she complete high risk cancer screening to be able to continue on HRT.      Per Epic, pt met with oncology, but has not completed high risk cancer screening.  Additionally, patient communicated via My Chart that she does not wish to continue care at [facility] clinic.    Tried to reach Richelle but received voicemail.  Left message that refill request was received, but in reviewing your chart some testing still needs to be completed and you indicated you may not want to continue care here.  Please call back to discuss at [phone].

## 2020-09-22 RX ORDER — ESTRADIOL 0.04 MG/D
1 PATCH, EXTENDED RELEASE TRANSDERMAL
Qty: 8 PATCH | Refills: 0 | Status: SHIPPED | OUTPATIENT
Start: 2020-09-24 | End: 2020-10-06

## 2020-09-22 NOTE — TELEPHONE ENCOUNTER
Received note from pharmacy that they cannot dispense 2 patches - can only dispense in multiples of 8.  Resent one month supply of 8 patches.

## 2020-10-06 ENCOUNTER — OFFICE VISIT (OUTPATIENT)
Dept: OBGYN | Facility: CLINIC | Age: 50
End: 2020-10-06
Payer: COMMERCIAL

## 2020-10-06 VITALS
HEIGHT: 70 IN | HEART RATE: 86 BPM | SYSTOLIC BLOOD PRESSURE: 120 MMHG | DIASTOLIC BLOOD PRESSURE: 81 MMHG | BODY MASS INDEX: 29.09 KG/M2 | OXYGEN SATURATION: 100 % | WEIGHT: 203.2 LBS

## 2020-10-06 DIAGNOSIS — Z00.00 ANNUAL PHYSICAL EXAM: Primary | ICD-10-CM

## 2020-10-06 DIAGNOSIS — Z23 NEED FOR PROPHYLACTIC VACCINATION AND INOCULATION AGAINST INFLUENZA: ICD-10-CM

## 2020-10-06 DIAGNOSIS — Z78.0 MENOPAUSE: ICD-10-CM

## 2020-10-06 PROCEDURE — 99386 PREV VISIT NEW AGE 40-64: CPT | Mod: 25 | Performed by: OBSTETRICS & GYNECOLOGY

## 2020-10-06 PROCEDURE — 90471 IMMUNIZATION ADMIN: CPT | Performed by: OBSTETRICS & GYNECOLOGY

## 2020-10-06 PROCEDURE — 90686 IIV4 VACC NO PRSV 0.5 ML IM: CPT | Performed by: OBSTETRICS & GYNECOLOGY

## 2020-10-06 RX ORDER — ESTRADIOL 0.04 MG/D
1 PATCH, EXTENDED RELEASE TRANSDERMAL
Qty: 8 PATCH | Refills: 3 | Status: SHIPPED | OUTPATIENT
Start: 2020-10-08 | End: 2021-02-09

## 2020-10-06 ASSESSMENT — MIFFLIN-ST. JEOR: SCORE: 1626.96

## 2020-10-06 NOTE — PROGRESS NOTES
Chief Complaint   Patient presents with     Physical       Subjective  Richelle Laura is a 50 year old female who presents for her annual exam.  Patient hasn't had a menses since age 46.  She has been taking HRT since 2018.  Her mother passed away from pancreatic cancer last year.  Her FSH was 70 in 2019.  No problems urinating.  Normal bowel movements with Mag.  Patient is not sexually active.  1 c section.  Patient wants the flu vaccine.      Most recent pap: 2018  History of abnormal Pap smear:  No  History of STI's:  No  History of PID:  No    Family history of uterine cancer:  No  Family history of ovarian cancer:  No  Family history of colon cancer:   No  Family history of breast cancer:  No    ROS  ROS: 10 point ROS neg other than the symptoms noted above in the HPI.    Past Medical History:   Diagnosis Date     Migraine      Past Surgical History:   Procedure Laterality Date     ABDOMEN SURGERY        SECTION       KNEE SURGERY       OPERATIVE HYSTEROSCOPY WITH MORCELLATOR N/A 9/10/2019    Procedure: Operative Hysteroscopy, With Morcellator;  Surgeon: Christi Maurer MD;  Location: [facility] OR     Family History   Problem Relation Age of Onset     Pancreatic Cancer Mother      Breast Cancer Maternal Aunt 34        BRCA 1 gene     Social History     Tobacco Use     Smoking status: Smoker, Current Status Unknown     Types: Cigarettes     Smokeless tobacco: Never Used     Tobacco comment: 3-4 cigarettes a day   Substance Use Topics     Alcohol use: Yes     Alcohol/week: 2.0 - 3.0 standard drinks     Types: 2 - 3 Glasses of wine per week     Drinks per session: 1 or 2       Tobacco abuse:  Yes, 2-3 cigs/day  Do you get at least three servings of calcium containing foods daily (dairy, green leafy vegetables, etc.)? yes   Outside of work or daily activities, how many days per week do you exercise for 30 minutes or longer? 3-4x per week  The patient does not drink >3 drinks per day nor >7 drinks per  "week.   Have you had an eye exam in the past two years? yes   Do you see a dentist twice per year? yes   Today's PHQ-2 Score:   Abuse: Current or Past(Physical, Sexual or Emotional)- No   Do you feel safe in your environment - Yes  Objective  Vitals: /81 (BP Location: Right arm, Cuff Size: Adult Regular)   Pulse 86   Ht 1.778 m (5' 10\")   Wt 92.2 kg (203 lb 3.2 oz)   SpO2 100%   BMI 29.16 kg/m    BMI= Body mass index is 29.16 kg/m .    General appearance=well developed, well-nourished female  Gait=normal  Psych=mood is stable, alert and oriented x3  HEENT=mucous membranes moist  Skin=no rashes or lesions seen,normal turgor   Breast:  Benign exam, no masses palpated.  No skin changes, no axillary lymphadenopathy, no nipple discharge.  Axilla feel completely normal, no lymph node enlargement and non-tender.  Neck=overall appearance is normal  Heart=RRR, no murmurs, no swelling noted  Thyroid=normal, no masses, no TTP, no enlargement  Lungs=non-labored breathing, no use of accessory muscles, clear to ausculation bilaterally  Abd=soft, Nontender/nondistended, +bowel sounds x4, no masses, no signs of hernias, no evidence of hepatosplenomegaly  PELVIC:    External genitalia: normal without lesions or masses  Urethral meatus: no lesions or prolapse noted, normal size  Urethra: no masses, non tender  Bladder: non tender, no fullness  Vagina: normal mucosa and rugae, no discharge.  Cervix: normal without lesion, no cervical motion tenderness, healthy, nulliparous  Uterus: small, mobile, nontender.  Adnexa: non tender, without masses  Rectal: deferred  Ext=no clubbing or cyanosis, no swelling      Last lipid profile:  2018  Regular self breast exam:  Yes  Most recent mammogram:  2019  History of abnormal mammogram:  Yes, calcifications  Fit testing:  N/A  DEXA:  N/A        Assessment/Plan  1.)  Annual/well woman exam=mammogram, flu vaccine, labs next year  2.)  Premature ovarian failure=HRT reordered      The " following topics were discussed or recommended   Discussed seat belt, helmet and sunscreen use  Vision screening   Mammogram   Calcium/Vitamin D supplement=Recommended 1000 mg of calcium daily and 800 IU of vitamin D.    25 minutes was spent face to face with the patient today discussing her history, diagnosis, and follow-up plan as noted above.  Over 50% of the visit was spent in counseling and coordination of care.    Total Visit Time: 30 minutes        Mary Bishop DO

## 2020-10-06 NOTE — PATIENT INSTRUCTIONS
PREVENTIVE HEALTH RECOMMENDATIONS:   Get a physical every year.  A pap test is important to have done starting at age 21 and then every three years as long as your pap is normal.  When you receive the results of your pap test it will include when you need to have your next pap test.    You should be tested each year for chlamydia and gonorrhea if you are aged 16-25 and if you have had a new sexual partner since you were last tested.   Vaccines: Get a flu shot each year.   Eat at least 8-10 servings of fruits and vegetables daily.  Eat whole-grain bread and cereal, whole-wheat pasta and brown rice instead of white grains and white rice.   For bone health: Eat calcium-rich foods (dairy products) or take calcium pills (500 to 600 mg with vitamin D) twice a day with food.   Exercise for an average of 30 minutes a day, 5 days of the week. It can be as simple as taking a brisk walk.  This will help you control your weight and prevent many diseases.   Limit alcohol to one drink per day.   Don't smoke and limit your exposure to second hand smoke.  If you smoke consider making a plan to quit. Go to [employer] and clink on   [employer]  for help   Wear sunscreen with at least SPF15 to prevent skin damage and skin cancer.   Brush your teeth twice a day and floss once a day and see your dentist twice a year for an exam and cleaning.   Have a great year and I will look forward to seeing you next year.   Mary Bishop, DO

## 2020-10-12 DIAGNOSIS — Z78.0 MENOPAUSE: ICD-10-CM

## 2021-02-09 DIAGNOSIS — Z78.0 MENOPAUSE: ICD-10-CM

## 2021-02-09 RX ORDER — ESTRADIOL 0.04 MG/D
1 PATCH, EXTENDED RELEASE TRANSDERMAL
Qty: 8 PATCH | Refills: 3 | Status: SHIPPED | OUTPATIENT
Start: 2021-02-11 | End: 2021-05-28

## 2021-02-09 NOTE — TELEPHONE ENCOUNTER
Patient calling to get a refill on her estradiol (VIVELLE-DOT) 0.0375 MG/24HR BIW patch  Med and pharmacy pended.. Please call once sent or if you have any questions for her... Okay to LM

## 2021-02-20 ENCOUNTER — HEALTH MAINTENANCE LETTER (OUTPATIENT)
Age: 51
End: 2021-02-20

## 2021-05-27 DIAGNOSIS — Z78.0 MENOPAUSE: ICD-10-CM

## 2021-05-27 NOTE — TELEPHONE ENCOUNTER
"Requested Prescriptions   Pending Prescriptions Disp Refills     estradiol (VIVELLE-DOT) 0.0375 MG/24HR BIW patch 8 patch 3     Sig: Place 1 patch onto the skin twice a week       Hormone Replacement Therapy Failed - 5/27/2021  1:59 PM        Failed - Patient has mammogram in past 2 years on file if age 50-75        Passed - Blood pressure under 140/90 in past 12 months     BP Readings from Last 3 Encounters:   10/06/20 120/81   03/06/20 (!) 128/91   09/10/19 119/85                 Passed - Recent (12 mo) or future (30 days) visit within the authorizing provider's specialty     Patient has had an office visit with the authorizing provider or a provider within the authorizing providers department within the previous 12 mos or has a future within next 30 days. See \"Patient Info\" tab in inbasket, or \"Choose Columns\" in Meds & Orders section of the refill encounter.              Passed - Medication is active on med list        Passed - Patient is 18 years of age or older        Passed - No active pregnancy on record        Passed - No positive pregnancy test on record in past 12 months           Rx last prescribed by Dr. Bishop on 2/9/21 with a one month supply with 3 additional refills.    Pt last saw Dr. Bishop on 10/6/2020 for a yearly physical.      Routing refill request to provider for review/approval because:  Pt hasn't had a mammogram in over 2 years, 3/21/19.    Jayla Sims RN          "

## 2021-05-27 NOTE — TELEPHONE ENCOUNTER
Date last filled 4/28/21  Quantity 8    Last seen 10/6/20 Physical  No future appointments    ALCON Pringle 5/27/2021

## 2021-05-28 RX ORDER — ESTRADIOL 0.04 MG/D
1 PATCH, EXTENDED RELEASE TRANSDERMAL
Qty: 8 PATCH | Refills: 3 | Status: SHIPPED | OUTPATIENT
Start: 2021-05-31 | End: 2021-08-26

## 2021-06-14 RX ORDER — TEMAZEPAM 15 MG/1
CAPSULE ORAL
Refills: 2 | Status: CANCELLED | OUTPATIENT
Start: 2021-06-14

## 2021-06-14 NOTE — TELEPHONE ENCOUNTER
Attempted to call pt to verify what medication she would like refilled.    Unable to reach patient via phone.  Left message to call clinic back at [phone].  CHET TorresN RN

## 2021-06-14 NOTE — TELEPHONE ENCOUNTER
Pt returned call. Would like RX filled for temazepam for menopause related insomnia.  Regular prescribing provider is out on an emergency, uable refill for pt.     [facility]    Prefers callback versus mychart.    Routing to RX auth basket.    Joann Cho, BSN RN

## 2021-06-14 NOTE — TELEPHONE ENCOUNTER
Requested Prescriptions   Pending Prescriptions Disp Refills     temazepam (RESTORIL) 15 MG capsule  2       There is no refill protocol information for this order        Last seen on 10/6/2020 for annual exam.     Last prescribed on 12/17/2018, 15mg capsules, take 1-2 C PO every day HS.  Pt states she takes 30mg @ night for sleep.    Pt has been having her Psychiatrist Dr. Mishra, fill this RX however provider is out on emergency and unable to refill RX for 6-8 weeks. Pt takes for menopausal related insomnia.  Has been seen by Dr. Bishop for HRT. Pt does not have PCP.    Pt prefers callback vs MyChart message.    Preferred pharmacy is [facility].    Routing to provider for RX advice.

## 2021-06-14 NOTE — TELEPHONE ENCOUNTER
RN relayed providers message. Pt will call to establish care with [facility] and schedule appointment.   Pt verbalizes understanding, agreement, and has no further questions.    GREG Torres RN

## 2021-06-14 NOTE — TELEPHONE ENCOUNTER
Health Call Center    Phone Message    May a detailed message be left on voicemail: no     Reason for Call: Other: Patient is calling today for refill on RX.  This is not prescribed by Dr. Bishop.  Normally by Dr. Mishra.  Dr. Mishra had an emergancy and is unable to refill for 6-8 weeks. Please advise. Thank you.      Action Taken: Message routed to:  [facility] p [phone]    Travel Screening: Not Applicable

## 2021-06-15 ENCOUNTER — OFFICE VISIT (OUTPATIENT)
Dept: FAMILY MEDICINE | Facility: CLINIC | Age: 51
End: 2021-06-15
Payer: COMMERCIAL

## 2021-06-15 VITALS
HEIGHT: 70 IN | BODY MASS INDEX: 27.06 KG/M2 | RESPIRATION RATE: 16 BRPM | WEIGHT: 189 LBS | OXYGEN SATURATION: 97 % | TEMPERATURE: 97.3 F | SYSTOLIC BLOOD PRESSURE: 126 MMHG | DIASTOLIC BLOOD PRESSURE: 86 MMHG | HEART RATE: 151 BPM

## 2021-06-15 DIAGNOSIS — Z12.31 ENCOUNTER FOR SCREENING MAMMOGRAM FOR BREAST CANCER: ICD-10-CM

## 2021-06-15 DIAGNOSIS — F51.01 PRIMARY INSOMNIA: Primary | ICD-10-CM

## 2021-06-15 PROCEDURE — 99213 OFFICE O/P EST LOW 20 MIN: CPT | Performed by: FAMILY MEDICINE

## 2021-06-15 RX ORDER — TEMAZEPAM 30 MG
30 CAPSULE ORAL AT BEDTIME
COMMUNITY
Start: 2021-02-13 | End: 2021-06-15

## 2021-06-15 RX ORDER — TEMAZEPAM 30 MG
30 CAPSULE ORAL AT BEDTIME
Qty: 30 CAPSULE | Refills: 1 | Status: SHIPPED | OUTPATIENT
Start: 2021-06-15 | End: 2021-10-12

## 2021-06-15 ASSESSMENT — PAIN SCALES - GENERAL: PAINLEVEL: SEVERE PAIN (6)

## 2021-06-15 ASSESSMENT — MIFFLIN-ST. JEOR: SCORE: 1557.55

## 2021-06-15 NOTE — PROGRESS NOTES
"    Assessment & Plan     Primary insomnia  Patient has been on temazepam for over a year and a half now.  Med reconciliation list was reviewed to verify the medication.  Her psychiatrist has retired very urgently due to medical reasons therefore she needs a refill.  Refill ordered for 2 months on temazepam.  Recommending to follow-up with psychiatry.  She will need to establish care with a new provider  - temazepam (RESTORIL) 30 MG capsule; Take 1 capsule (30 mg) by mouth At Bedtime    Encounter for screening mammogram for breast cancer    - *MA Screening Digital Bilateral; Future    0956}    BMI:   Estimated body mass index is 27.12 kg/m  as calculated from the following:    Height as of this encounter: 1.778 m (5' 10\").    Weight as of this encounter: 85.7 kg (189 lb).         Return in about 13 days (around 6/28/2021) for Annual Physical Exam.    Isadora Horner MD  [facility] FLOR PRAIRICARLEY Peoples is a 51 year old who presents for the following health issues     HPI     Concern - Medication Recheck    Musculoskeletal problem/pain  Onset/Duration: April 2021  Description  Location: middle back into ribs - right  Joint Swelling: no  Redness: no  Pain: YES  Warmth: no  Intensity:  moderate, severe  Progression of Symptoms:  same and constant  Accompanying signs and symptoms:   Fevers: no  Numbness/tingling/weakness: no  History  Trauma to the area: no  Recent illness:  no  Previous similar problem: no  Previous evaluation:  YES- Urgent Care at [facility] and did a ultra sound to check gallbladder.  Precipitating or alleviating factors:  Aggravating factors include: at night when she has ate, it feels full and nagging.  Therapies tried and outcome: Ibuprofen      Review of Systems   CONSTITUTIONAL: NEGATIVE for fever, chills, change in weight  ENT/MOUTH: NEGATIVE for ear, mouth and throat problems  RESP: NEGATIVE for significant cough or SOB  CV: NEGATIVE for chest pain, palpitations or " "peripheral edema      Objective    /86   Pulse 151   Temp 97.3  F (36.3  C) (Tympanic)   Resp 16   Ht 1.778 m (5' 10\")   Wt 85.7 kg (189 lb)   SpO2 97%   BMI 27.12 kg/m    Body mass index is 27.12 kg/m .  Physical Exam   GENERAL: healthy, alert and no distress  RESP: lungs clear to auscultation - no rales, rhonchi or wheezes  CV: regular rate and rhythm, normal S1 S2, no S3 or S4, no murmur, click or rub, no peripheral edema and peripheral pulses strong  ABDOMEN: soft, nontender, no hepatosplenomegaly, no masses and bowel sounds normal  PSYCH: mentation appears normal, affect normal/bright                "

## 2021-06-28 ENCOUNTER — OFFICE VISIT (OUTPATIENT)
Dept: FAMILY MEDICINE | Facility: CLINIC | Age: 51
End: 2021-06-28
Payer: COMMERCIAL

## 2021-06-28 VITALS
HEART RATE: 85 BPM | DIASTOLIC BLOOD PRESSURE: 80 MMHG | TEMPERATURE: 97.4 F | SYSTOLIC BLOOD PRESSURE: 120 MMHG | RESPIRATION RATE: 16 BRPM | BODY MASS INDEX: 27.26 KG/M2 | OXYGEN SATURATION: 97 % | WEIGHT: 190 LBS

## 2021-06-28 DIAGNOSIS — Z12.4 SCREENING FOR MALIGNANT NEOPLASM OF CERVIX: ICD-10-CM

## 2021-06-28 DIAGNOSIS — Z11.4 SCREENING FOR HIV (HUMAN IMMUNODEFICIENCY VIRUS): ICD-10-CM

## 2021-06-28 DIAGNOSIS — Z11.59 NEED FOR HEPATITIS C SCREENING TEST: ICD-10-CM

## 2021-06-28 DIAGNOSIS — Z00.00 ANNUAL PHYSICAL EXAM: Primary | ICD-10-CM

## 2021-06-28 DIAGNOSIS — Z13.220 SCREENING FOR HYPERLIPIDEMIA: ICD-10-CM

## 2021-06-28 DIAGNOSIS — Z12.11 SCREEN FOR COLON CANCER: ICD-10-CM

## 2021-06-28 DIAGNOSIS — Z80.0 FAMILY HISTORY OF PANCREATIC CANCER: ICD-10-CM

## 2021-06-28 LAB
ALBUMIN SERPL-MCNC: 3.9 G/DL (ref 3.4–5)
ALP SERPL-CCNC: 76 U/L (ref 40–150)
ALT SERPL W P-5'-P-CCNC: 29 U/L (ref 0–50)
AMYLASE SERPL-CCNC: 36 U/L (ref 30–110)
ANION GAP SERPL CALCULATED.3IONS-SCNC: 5 MMOL/L (ref 3–14)
AST SERPL W P-5'-P-CCNC: 15 U/L (ref 0–45)
BILIRUB SERPL-MCNC: 0.5 MG/DL (ref 0.2–1.3)
BUN SERPL-MCNC: 10 MG/DL (ref 7–30)
CALCIUM SERPL-MCNC: 9.3 MG/DL (ref 8.5–10.1)
CHLORIDE SERPL-SCNC: 106 MMOL/L (ref 94–109)
CHOLEST SERPL-MCNC: 213 MG/DL
CO2 SERPL-SCNC: 26 MMOL/L (ref 20–32)
CREAT SERPL-MCNC: 0.68 MG/DL (ref 0.52–1.04)
ERYTHROCYTE [DISTWIDTH] IN BLOOD BY AUTOMATED COUNT: 14.4 % (ref 10–15)
GFR SERPL CREATININE-BSD FRML MDRD: >90 ML/MIN/{1.73_M2}
GLUCOSE SERPL-MCNC: 99 MG/DL (ref 70–99)
HCT VFR BLD AUTO: 42.9 % (ref 35–47)
HDLC SERPL-MCNC: 56 MG/DL
HGB BLD-MCNC: 14.6 G/DL (ref 11.7–15.7)
LDLC SERPL CALC-MCNC: 115 MG/DL
LIPASE SERPL-CCNC: 67 U/L (ref 73–393)
MCH RBC QN AUTO: 31.3 PG (ref 26.5–33)
MCHC RBC AUTO-ENTMCNC: 34 G/DL (ref 31.5–36.5)
MCV RBC AUTO: 92 FL (ref 78–100)
NONHDLC SERPL-MCNC: 157 MG/DL
PLATELET # BLD AUTO: 335 10E9/L (ref 150–450)
POTASSIUM SERPL-SCNC: 3.7 MMOL/L (ref 3.4–5.3)
PROT SERPL-MCNC: 7.7 G/DL (ref 6.8–8.8)
RBC # BLD AUTO: 4.66 10E12/L (ref 3.8–5.2)
SODIUM SERPL-SCNC: 137 MMOL/L (ref 133–144)
TRIGL SERPL-MCNC: 210 MG/DL
TSH SERPL DL<=0.005 MIU/L-ACNC: 1.94 MU/L (ref 0.4–4)
WBC # BLD AUTO: 8.6 10E9/L (ref 4–11)

## 2021-06-28 PROCEDURE — 83690 ASSAY OF LIPASE: CPT | Performed by: FAMILY MEDICINE

## 2021-06-28 PROCEDURE — 80053 COMPREHEN METABOLIC PANEL: CPT | Performed by: FAMILY MEDICINE

## 2021-06-28 PROCEDURE — 36415 COLL VENOUS BLD VENIPUNCTURE: CPT | Performed by: FAMILY MEDICINE

## 2021-06-28 PROCEDURE — 82150 ASSAY OF AMYLASE: CPT | Performed by: FAMILY MEDICINE

## 2021-06-28 PROCEDURE — G0145 SCR C/V CYTO,THINLAYER,RESCR: HCPCS | Performed by: FAMILY MEDICINE

## 2021-06-28 PROCEDURE — 87624 HPV HI-RISK TYP POOLED RSLT: CPT | Performed by: FAMILY MEDICINE

## 2021-06-28 PROCEDURE — 80061 LIPID PANEL: CPT | Performed by: FAMILY MEDICINE

## 2021-06-28 PROCEDURE — 84443 ASSAY THYROID STIM HORMONE: CPT | Performed by: FAMILY MEDICINE

## 2021-06-28 PROCEDURE — 87389 HIV-1 AG W/HIV-1&-2 AB AG IA: CPT | Performed by: FAMILY MEDICINE

## 2021-06-28 PROCEDURE — 85027 COMPLETE CBC AUTOMATED: CPT | Performed by: FAMILY MEDICINE

## 2021-06-28 PROCEDURE — 99396 PREV VISIT EST AGE 40-64: CPT | Performed by: FAMILY MEDICINE

## 2021-06-28 PROCEDURE — 86803 HEPATITIS C AB TEST: CPT | Performed by: FAMILY MEDICINE

## 2021-06-28 ASSESSMENT — ENCOUNTER SYMPTOMS
DIZZINESS: 0
FEVER: 0
MYALGIAS: 0
NAUSEA: 0
CHILLS: 0
WEAKNESS: 0
EYE PAIN: 0
COUGH: 0
DYSURIA: 0
NERVOUS/ANXIOUS: 1
BREAST MASS: 0
HEMATURIA: 0
DIARRHEA: 0
HEARTBURN: 0
ABDOMINAL PAIN: 1
ARTHRALGIAS: 1
PARESTHESIAS: 0
SHORTNESS OF BREATH: 0
HEMATOCHEZIA: 0
HEADACHES: 0
PALPITATIONS: 0
CONSTIPATION: 0
JOINT SWELLING: 1
FREQUENCY: 0
SORE THROAT: 0

## 2021-06-28 NOTE — PROGRESS NOTES
SUBJECTIVE:   CC: Richelle Laura is an 51 year old woman who presents for preventive health visit.       Patient has been advised of split billing requirements and indicates understanding: Yes  Healthy Habits:     Getting at least 3 servings of Calcium per day:  Yes    Bi-annual eye exam:  NO    Dental care twice a year:  NO    Sleep apnea or symptoms of sleep apnea:  None    Diet:  Breakfast skipped    Frequency of exercise:  None    Duration of exercise:  N/A    Taking medications regularly:  Yes    Barriers to taking medications:  None    Medication side effects:  Not applicable    PHQ-2 Total Score: 0    Additional concerns today:  No          Today's PHQ-2 Score:   PHQ-2 ( 1999 Pfizer) 6/28/2021   Q1: Little interest or pleasure in doing things 0   Q2: Feeling down, depressed or hopeless 0   PHQ-2 Score 0   Q1: Little interest or pleasure in doing things Not at all   Q2: Feeling down, depressed or hopeless Not at all   PHQ-2 Score 0       Abuse: Current or Past (Physical, Sexual or Emotional) - No  Do you feel safe in your environment? Yes    Have you ever done Advance Care Planning? (For example, a Health Directive, POLST, or a discussion with a medical provider or your loved ones about your wishes): No, advance care planning information given to patient to review.  Patient plans to discuss their wishes with loved ones or provider.      Social History     Tobacco Use     Smoking status: Current Every Day Smoker     Types: Cigarettes     Smokeless tobacco: Never Used     Tobacco comment: 3-4 cigarettes a day   Substance Use Topics     Alcohol use: Yes     Alcohol/week: 2.0 - 3.0 standard drinks     Types: 2 - 3 Glasses of wine per week     Drinks per session: 1 or 2     If you drink alcohol do you typically have >3 drinks per day or >7 drinks per week? No    Alcohol Use 6/28/2021   Prescreen: >3 drinks/day or >7 drinks/week? Yes   Prescreen: >3 drinks/day or >7 drinks/week? -   AUDIT SCORE  5       Reviewed  orders with patient.  Reviewed health maintenance and updated orders accordingly - Yes  BP Readings from Last 3 Encounters:   21 120/80   06/15/21 126/86   10/06/20 120/81    Wt Readings from Last 3 Encounters:   21 86.2 kg (190 lb)   06/15/21 85.7 kg (189 lb)   10/06/20 92.2 kg (203 lb 3.2 oz)                  Patient Active Problem List   Diagnosis     Migraine with aura     Pre-operative examination     Menopause     Past Surgical History:   Procedure Laterality Date     ABDOMEN SURGERY        SECTION       KNEE SURGERY       OPERATIVE HYSTEROSCOPY WITH MORCELLATOR N/A 9/10/2019    Procedure: Operative Hysteroscopy, With Morcellator;  Surgeon: Christi Maurer MD;  Location:  OR       Social History     Tobacco Use     Smoking status: Current Every Day Smoker     Types: Cigarettes     Smokeless tobacco: Never Used     Tobacco comment: 3-4 cigarettes a day   Substance Use Topics     Alcohol use: Yes     Alcohol/week: 2.0 - 3.0 standard drinks     Types: 2 - 3 Glasses of wine per week     Drinks per session: 1 or 2     Family History   Problem Relation Age of Onset     Pancreatic Cancer Mother      Breast Cancer Maternal Aunt 34        BRCA 1 gene         Current Outpatient Medications   Medication Sig Dispense Refill     Cholecalciferol (VITAMIN D3 PO) Take 1,000 Units by mouth daily       estradiol (VIVELLE-DOT) 0.0375 MG/24HR BIW patch Place 1 patch onto the skin twice a week 8 patch 3     MAGNESIUM PO        Multiple Vitamin (MULTIVITAMIN ADULT PO)        Nutritional Supplements (NUTRITIONAL SUPPLEMENT PLUS) LIQD CBD oil       progesterone (PROMETRIUM) 100 MG capsule Take 1 capsule (100 mg) by mouth At Bedtime 90 capsule 3     temazepam (RESTORIL) 30 MG capsule Take 1 capsule (30 mg) by mouth At Bedtime 30 capsule 1     Allergies   Allergen Reactions     Sulfa Drugs Hives     PN: Swelling     Compazine [Prochlorperazine]      Phenothiazines      PN:  skin crawls,sleeplessness        Reglan [Metoclopramide]      Tardive Dyskinesia       Breast Cancer Screening:  Any new diagnosis of family breast, ovarian, or bowel cancer? No    FHS-7: No flowsheet data found.    Mammogram Screening: Recommended annual mammography  Pertinent mammograms are reviewed under the imaging tab.    History of abnormal Pap smear: NO - age 30-65 PAP every 5 years with negative HPV co-testing recommended     Reviewed and updated as needed this visit by clinical staff  Tobacco  Allergies  Meds   Med Hx  Surg Hx  Fam Hx  Soc Hx        Reviewed and updated as needed this visit by Provider  Tobacco  Allergies                  Review of Systems   Constitutional: Negative for chills and fever.   HENT: Negative for congestion, ear pain, hearing loss and sore throat.    Eyes: Negative for pain and visual disturbance.   Respiratory: Negative for cough and shortness of breath.    Cardiovascular: Negative for chest pain, palpitations and peripheral edema.   Gastrointestinal: Positive for abdominal pain. Negative for constipation, diarrhea, heartburn, hematochezia and nausea.   Breasts:  Negative for tenderness, breast mass and discharge.   Genitourinary: Negative for dysuria, frequency, genital sores, hematuria, pelvic pain, urgency, vaginal bleeding and vaginal discharge.   Musculoskeletal: Positive for arthralgias and joint swelling. Negative for myalgias.   Skin: Negative for rash.   Neurological: Negative for dizziness, weakness, headaches and paresthesias.   Psychiatric/Behavioral: Negative for mood changes. The patient is nervous/anxious.      CONSTITUTIONAL: NEGATIVE for fever, chills, change in weight  INTEGUMENTARY/SKIN: NEGATIVE for worrisome rashes, moles or lesions  EYES: NEGATIVE for vision changes or irritation  ENT: NEGATIVE for ear, mouth and throat problems  RESP: NEGATIVE for significant cough or SOB  BREAST: NEGATIVE for masses, tenderness or discharge  CV: NEGATIVE for chest pain, palpitations or  peripheral edema  GI: NEGATIVE for nausea, heartburn, or change in bowel habits  : NEGATIVE for unusual urinary or vaginal symptoms. No vaginal bleeding.  MUSCULOSKELETAL: NEGATIVE for significant arthralgias or myalgia  NEURO: NEGATIVE for weakness, dizziness or paresthesias  PSYCHIATRIC: NEGATIVE for changes in mood or affect      OBJECTIVE:   /80   Pulse 85   Temp 97.4  F (36.3  C)   Resp 16   Wt 86.2 kg (190 lb)   SpO2 97%   BMI 27.26 kg/m    Physical Exam  GENERAL APPEARANCE: healthy, alert and no distress  EYES: Eyes grossly normal to inspection, PERRL and conjunctivae and sclerae normal  HENT: ear canals and TM's normal, nose and mouth without ulcers or lesions, oropharynx clear and oral mucous membranes moist  NECK: no adenopathy, no asymmetry, masses, or scars and thyroid normal to palpation  RESP: lungs clear to auscultation - no rales, rhonchi or wheezes  BREAST: normal without masses, tenderness or nipple discharge and no palpable axillary masses or adenopathy  CV: regular rate and rhythm, normal S1 S2, no S3 or S4, no murmur, click or rub, no peripheral edema and peripheral pulses strong  ABDOMEN: soft, nontender, no hepatosplenomegaly, no masses and bowel sounds normal  MS: no musculoskeletal defects are noted and gait is age appropriate without ataxia  SKIN: no suspicious lesions or rashes  NEURO: Normal strength and tone, sensory exam grossly normal, mentation intact and speech normal  PSYCH: mentation appears normal and affect normal/bright        ASSESSMENT/PLAN:   1. Annual physical exam  Screening labs ordered.  - TSH with free T4 reflex  - CBC with platelets  - Comprehensive metabolic panel    2. Screen for colon cancer    - Fecal colorectal cancer screen (FIT); Future    3. Screening for HIV (human immunodeficiency virus)    - HIV Antigen Antibody Combo    4. Need for hepatitis C screening test    - Hepatitis C Screen Reflex to HCV RNA Quant and Genotype    5. Screening for  "hyperlipidemia    - Lipid panel reflex to direct LDL Fasting    6. Screening for malignant neoplasm of cervix    - Pap imaged thin layer screen with HPV - recommended age 30 - 65 years (select HPV order below)  - HPV High Risk Types DNA Cervical    7. Family history of pancreatic cancer    - Lipase  - Amylase    Patient has been advised of split billing requirements and indicates understanding:   COUNSELING:  Reviewed preventive health counseling, as reflected in patient instructions       Regular exercise       Healthy diet/nutrition    Estimated body mass index is 27.26 kg/m  as calculated from the following:    Height as of 6/15/21: 1.778 m (5' 10\").    Weight as of this encounter: 86.2 kg (190 lb).    Weight management plan: Discussed healthy diet and exercise guidelines    She reports that she has been smoking cigarettes. She has never used smokeless tobacco.  Tobacco Cessation Action Plan:   Information offered: Patient not interested at this time      Counseling Resources:  ATP IV Guidelines  Pooled Cohorts Equation Calculator  Breast Cancer Risk Calculator  BRCA-Related Cancer Risk Assessment: FHS-7 Tool  FRAX Risk Assessment  ICSI Preventive Guidelines  Dietary Guidelines for Americans, 2010  USDA's MyPlate  ASA Prophylaxis  Lung CA Screening    Isadora Horner MD  [facility]  "

## 2021-06-29 LAB
HCV AB SERPL QL IA: NONREACTIVE
HIV 1+2 AB+HIV1 P24 AG SERPL QL IA: NONREACTIVE

## 2021-06-30 LAB
COPATH REPORT: NORMAL
PAP: NORMAL

## 2021-07-01 LAB
FINAL DIAGNOSIS: NORMAL
HPV HR 12 DNA CVX QL NAA+PROBE: NEGATIVE
HPV16 DNA SPEC QL NAA+PROBE: NEGATIVE
HPV18 DNA SPEC QL NAA+PROBE: NEGATIVE
SPECIMEN DESCRIPTION: NORMAL
SPECIMEN SOURCE CVX/VAG CYTO: NORMAL

## 2021-07-07 ENCOUNTER — ANCILLARY PROCEDURE (OUTPATIENT)
Dept: MAMMOGRAPHY | Facility: CLINIC | Age: 51
End: 2021-07-07
Payer: COMMERCIAL

## 2021-07-07 DIAGNOSIS — Z12.31 ENCOUNTER FOR SCREENING MAMMOGRAM FOR BREAST CANCER: ICD-10-CM

## 2021-07-07 PROCEDURE — 77067 SCR MAMMO BI INCL CAD: CPT | Mod: TC | Performed by: RADIOLOGY

## 2021-07-07 PROCEDURE — 77063 BREAST TOMOSYNTHESIS BI: CPT | Mod: TC | Performed by: RADIOLOGY

## 2021-08-26 DIAGNOSIS — Z78.0 MENOPAUSE: ICD-10-CM

## 2021-08-26 RX ORDER — ESTRADIOL 0.04 MG/D
1 PATCH, EXTENDED RELEASE TRANSDERMAL
Qty: 8 PATCH | Refills: 1 | Status: SHIPPED | OUTPATIENT
Start: 2021-08-26 | End: 2021-10-12

## 2021-08-26 NOTE — TELEPHONE ENCOUNTER
Date last filled does not atate  Quantity 8    Last seen 10/6/20  Physical    ALCON Pringle 8/26/2021

## 2021-08-26 NOTE — TELEPHONE ENCOUNTER
"Requested Prescriptions   Pending Prescriptions Disp Refills     estradiol (VIVELLE-DOT) 0.0375 MG/24HR BIW patch 8 patch 3     Sig: Place 1 patch onto the skin twice a week       Hormone Replacement Therapy Passed - 8/26/2021  2:06 PM        Passed - Blood pressure under 140/90 in past 12 months     BP Readings from Last 3 Encounters:   06/28/21 120/80   06/15/21 126/86   10/06/20 120/81                 Passed - Recent (12 mo) or future (30 days) visit within the authorizing provider's specialty     Patient has had an office visit with the authorizing provider or a provider within the authorizing providers department within the previous 12 mos or has a future within next 30 days. See \"Patient Info\" tab in inbasket, or \"Choose Columns\" in Meds & Orders section of the refill encounter.              Passed - Patient has mammogram in past 2 years on file if age 50-75        Passed - Medication is active on med list        Passed - Patient is 18 years of age or older        Passed - No active pregnancy on record        Passed - No positive pregnancy test on record in past 12 months           Pt last seen 10/6/2020 for annual exam    Last prescribed 5/31/2021 for 8 patches with 3 refills.    Prescription approved per [facility] Refill Protocol.    RN sent refill to get pt to annual exam.    Zaida Moreno RN on 8/26/2021 at 2:11 PM    "

## 2021-09-20 DIAGNOSIS — Z78.0 MENOPAUSE: ICD-10-CM

## 2021-09-20 RX ORDER — ESTRADIOL 0.04 MG/D
1 PATCH, EXTENDED RELEASE TRANSDERMAL
Qty: 8 PATCH | Refills: 1 | Status: CANCELLED | OUTPATIENT
Start: 2021-09-20

## 2021-09-20 NOTE — TELEPHONE ENCOUNTER
[facility] Call Center    Phone Message    May a detailed message be left on voicemail: yes     Reason for Call: Medication Refill Request    Has the patient contacted the pharmacy for the refill? Yes   Name of medication being requested: estradiol (VIVELLE-DOT) 0.0375 MG/24HR BIW patch  Provider who prescribed the medication: Dario  Pharmacy: [id number] [place] [address] AT [facility] & [address] [facility] [address]  Date medication is needed: Patient needs a refill before her 10/12 appointment. Please advise. Thank you.      Action Taken: Message routed to:  [facility] p [phone]    Travel Screening: Not Applicable

## 2021-09-20 NOTE — TELEPHONE ENCOUNTER
estradiol (VIVELLE-DOT) 0.0375 MG/24HR BIW patch was prescribed to pt on 8/26 for a one month supply with one additional refill.    Pt should have a refill left at her pharmacy.  Sending pt a [employer] message.    Jayla Sims RN

## 2021-09-25 ENCOUNTER — HEALTH MAINTENANCE LETTER (OUTPATIENT)
Age: 51
End: 2021-09-25

## 2021-10-12 ENCOUNTER — OFFICE VISIT (OUTPATIENT)
Dept: OBGYN | Facility: CLINIC | Age: 51
End: 2021-10-12
Payer: COMMERCIAL

## 2021-10-12 VITALS
HEIGHT: 70 IN | BODY MASS INDEX: 27.2 KG/M2 | SYSTOLIC BLOOD PRESSURE: 123 MMHG | WEIGHT: 190 LBS | OXYGEN SATURATION: 99 % | HEART RATE: 88 BPM | DIASTOLIC BLOOD PRESSURE: 84 MMHG

## 2021-10-12 DIAGNOSIS — F51.01 PRIMARY INSOMNIA: ICD-10-CM

## 2021-10-12 DIAGNOSIS — Z00.00 ANNUAL PHYSICAL EXAM: Primary | ICD-10-CM

## 2021-10-12 DIAGNOSIS — Z78.0 MENOPAUSE: ICD-10-CM

## 2021-10-12 PROBLEM — Z01.818 PRE-OPERATIVE EXAMINATION: Status: RESOLVED | Noted: 2019-09-05 | Resolved: 2021-10-12

## 2021-10-12 PROCEDURE — 99396 PREV VISIT EST AGE 40-64: CPT | Performed by: OBSTETRICS & GYNECOLOGY

## 2021-10-12 RX ORDER — ESTRADIOL 0.04 MG/D
1 PATCH, EXTENDED RELEASE TRANSDERMAL
Qty: 8 PATCH | Refills: 1 | Status: SHIPPED | OUTPATIENT
Start: 2021-10-14 | End: 2022-01-07

## 2021-10-12 RX ORDER — PROGESTERONE 100 MG/1
100 CAPSULE ORAL AT BEDTIME
Qty: 90 CAPSULE | Refills: 1 | Status: SHIPPED | OUTPATIENT
Start: 2021-10-12 | End: 2022-04-07

## 2021-10-12 RX ORDER — TEMAZEPAM 30 MG
30 CAPSULE ORAL AT BEDTIME
Qty: 30 CAPSULE | Refills: 1 | Status: SHIPPED | OUTPATIENT
Start: 2021-10-12 | End: 2023-08-01

## 2021-10-12 RX ORDER — TEMAZEPAM 30 MG
30 CAPSULE ORAL AT BEDTIME
Qty: 30 CAPSULE | Refills: 1 | Status: CANCELLED | OUTPATIENT
Start: 2021-10-12

## 2021-10-12 ASSESSMENT — MIFFLIN-ST. JEOR: SCORE: 1562.08

## 2021-10-12 NOTE — PROGRESS NOTES
Chief Complaint   Patient presents with     Prenatal Care       Subjective  Richelle Laura is a 51 year old female who presents for her annual exam.  Patient hasn't had a menses since age 46.  She has been taking HRT since 2018.  Patient is not ready to stop this.  She tried to stop for 5 days and did not do well.  She understands she should wean off.  Her FSH was 70 in 2019.  No problems urinating.  Normal bowel movements.  Patient is not sexually active.  1 c section.  Patient does not want the flu vaccine.      Most recent pap:  This year  History of abnormal Pap smear:  No  History of STI's:  No  History of PID:  No    Family history of uterine cancer:  No  Family history of ovarian cancer: No  Family history of colon cancer:   No  Family history of breast cancer:  Maternal mom    ROS  ROS: 10 point ROS neg other than the symptoms noted above in the HPI.    Past Medical History:   Diagnosis Date     Migraine      Past Surgical History:   Procedure Laterality Date     ABDOMEN SURGERY        SECTION       KNEE SURGERY       OPERATIVE HYSTEROSCOPY WITH MORCELLATOR N/A 9/10/2019    Procedure: Operative Hysteroscopy, With Morcellator;  Surgeon: Christi Mauerr MD;  Location:  OR     Family History   Problem Relation Age of Onset     Pancreatic Cancer Mother      Breast Cancer Maternal Aunt 34        BRCA 1 gene     Social History     Tobacco Use     Smoking status: Current Every Day Smoker     Types: Cigarettes     Smokeless tobacco: Never Used     Tobacco comment: 3-4 cigarettes a day   Substance Use Topics     Alcohol use: Yes     Alcohol/week: 2.0 - 3.0 standard drinks     Types: 2 - 3 Glasses of wine per week       Tobacco abuse:  2-3 cigs/day  Do you get at least three servings of calcium containing foods daily (dairy, green leafy vegetables, etc.)? yes   Outside of work or daily activities, how many days per week do you exercise for 30 minutes or longer? 3-4x per week  The patient does not  "drink >3 drinks per day nor >7 drinks per week.   Have you had an eye exam in the past two years? yes   Do you see a dentist twice per year? yes   Today's PHQ-2 Score:   Abuse: Current or Past(Physical, Sexual or Emotional)- No   Do you feel safe in your environment - Yes  Objective  Vitals: /84 (BP Location: Right arm, Cuff Size: Adult Regular)   Pulse 88   Ht 1.778 m (5' 10\")   Wt 86.2 kg (190 lb)   SpO2 99%   Breastfeeding No   BMI 27.26 kg/m    BMI= Body mass index is 27.26 kg/m .    General appearance=well developed, well-nourished female  Gait=normal  Psych=mood is stable, alert and oriented x3  HEENT=mucous membranes moist  Skin=no rashes or lesions seen,normal turgor   Breast:  Benign exam, no masses palpated.  No skin changes, no axillary lymphadenopathy, no nipple discharge.  Axilla feel completely normal, no lymph node enlargement and non-tender.  Neck=overall appearance is normal  Heart=RRR, no murmurs, no swelling noted  Thyroid=normal, no masses, no TTP, no enlargement  Lungs=non-labored breathing, no use of accessory muscles, clear to ausculation bilaterally  Abd=soft, Nontender/nondistended, +bowel sounds x4, no masses, no signs of hernias, no evidence of hepatosplenomegaly  PELVIC:    External genitalia: normal without lesions or masses  Urethral meatus: no lesions or prolapse noted, normal size  Urethra: no masses, non tender  Bladder: non tender, no fullness  Vagina: normal mucosa and rugae, no discharge.  Cervix: normal without lesion, no cervical motion tenderness, healthy, nulliparous  Uterus: small, mobile, nontender.  Adnexa: non tender, without masses  Rectal: deferred  Ext=no clubbing or cyanosis, no swelling      Last lipid profile:  2021  Regular self breast exam:  Yes  Most recent mammogram:  2021  History of abnormal mammogram:  Years ago.  Bx taken.  Negative results  Fit testing:  No  DEXA:  Ordering        Assessment/Plan  1.)  Annual/well woman exam=DEXA, " colonoscopy  2.)  HRT=meds ordered, patient needs to wean off, patient aware of risks asso  3.)  Insomnia=restoril ordered, patient to follow up with Psych soon-her's just retired  4.)  Tobacco abuse=we discussed the importance of needing to quit  5.)  Migraines with auras=many years ago.  They were related to her menses      The following topics were discussed or recommended   Discussed seat belt, helmet and sunscreen use  Vision screening   Calcium/Vitamin D supplement=Recommended 1000 mg of calcium daily and 800 IU of vitamin D.    30 minutes were spent on the date of the encounter doing chart review, history and exam, documentation, and further activities as noted above.        Mary Bishop, DO

## 2021-10-12 NOTE — PATIENT INSTRUCTIONS
If you have labs or imaging done, the results will automatically release in [facility] without an interpretation.  Your health care professional will review those results and send an interpretation with recommendations as soon as possible, but this may be 1-3 business days.    If you have any questions regarding your visit, please contact your care team.     [employer]: [phone]  [facility] CLINIC HOURS TELEPHONE NUMBER       Mary BishopDO Esteban - YAHIR Cerda-VERNON Dickerson-VERNON David-VERNON Delgado-  Rosita-     Monday- [place]  8:00 a.m - 5:00 p.m    Tuesday- [place]  8:00 a.m - 5:00 p.m    Wednesday- OFF    Thursday- Surgery     Friday- [place]  8:00 a.m - 5:00 p.m. [facility]  [address]  [place] [address]  [phone] Fax  Imaging [phone]    [facility] Delivery  [address] [place] [address] [place] [address]  [phone]    [facility]  [address] [place] [address]  [phone] Fax  Imaging [phone]     Urgent Care locations:    [facility] Monday-Friday  10 am - 8 pm  Saturday and Sunday   9 am - 5 pm  Monday-Friday   10 am - 8 pm  Saturday and Sunday   9 am - 5 pm   [phone]     If you need a medication refill, please contact your pharmacy. Please allow 3 business days for your refill to be completed.    As always, thank you for trusting us with your healthcare needs!

## 2022-02-28 DIAGNOSIS — Z78.0 MENOPAUSE: ICD-10-CM

## 2022-02-28 RX ORDER — ESTRADIOL 0.04 MG/D
1 PATCH, EXTENDED RELEASE TRANSDERMAL
Qty: 8 PATCH | Refills: 11 | Status: SHIPPED | OUTPATIENT
Start: 2022-02-28 | End: 2023-01-31

## 2022-02-28 NOTE — TELEPHONE ENCOUNTER
"Requested Prescriptions   Pending Prescriptions Disp Refills     estradiol (VIVELLE-DOT) 0.0375 MG/24HR BIW patch 8 patch 1     Sig: Place 1 patch onto the skin twice a week       Hormone Replacement Therapy Passed - 2/28/2022 10:13 AM        Passed - Blood pressure under 140/90 in past 12 months     BP Readings from Last 3 Encounters:   10/12/21 123/84   06/28/21 120/80   06/15/21 126/86                 Passed - Recent (12 mo) or future (30 days) visit within the authorizing provider's specialty     Patient has had an office visit with the authorizing provider or a provider within the authorizing providers department within the previous 12 mos or has a future within next 30 days. See \"Patient Info\" tab in inbasket, or \"Choose Columns\" in Meds & Orders section of the refill encounter.              Passed - Patient has mammogram in past 2 years on file if age 50-75        Passed - Medication is active on med list        Passed - Patient is 18 years of age or older        Passed - No active pregnancy on record        Passed - No positive pregnancy test on record in past 12 months           Pt last seen 10/12/2021 for annual exam    Last prescribed 1/10/2022 for 8 patches with 1 refill    RN routing to provider if OK to fill more than one month at a time. RN queued medication with more refills    Zaida Moreno RN on 2/28/2022 at 1:04 PM      "

## 2022-02-28 NOTE — TELEPHONE ENCOUNTER
[facility] Call Center    Phone Message    May a detailed message be left on voicemail: yes    Reason for Call: Medication Refill Request    Has the patient contacted the pharmacy for the refill? Yes   Name of medication being requested: estradiol (VIVELLE-DOT) 0.0375 MG/24HR BIW patch  Provider who prescribed the medication: Dario  [id number] [place] [address] [facility] [address] [facility] [address]  Date medication is needed: Today.  Pt states that she has to go through this every month for a refill.  She is wondering why the pharm has to call every month for a refill.  Pt is requesting a call back.         Action Taken: Message routed to:  [facility] p [phone]    Travel Screening: Not Applicable

## 2022-03-15 NOTE — NURSING NOTE
Chief Complaint   Patient presents with     Menopausal Sx     Pt here to discuss menopausal symptoms.    spouse

## 2022-04-07 DIAGNOSIS — Z78.0 MENOPAUSE: ICD-10-CM

## 2022-04-07 RX ORDER — PROGESTERONE 100 MG/1
CAPSULE ORAL
Qty: 90 CAPSULE | Refills: 1 | Status: SHIPPED | OUTPATIENT
Start: 2022-04-07 | End: 2022-10-19

## 2022-04-07 NOTE — TELEPHONE ENCOUNTER
"Requested Prescriptions   Pending Prescriptions Disp Refills     progesterone (PROMETRIUM) 100 MG capsule [Pharmacy Med Name: PROGESTERONE MICRO 100MG CAPSULES] 90 capsule 1     Sig: TAKE 1 CAPSULE(100 MG) BY MOUTH AT BEDTIME       Hormone Replacement Therapy Passed - 4/7/2022 12:00 PM        Passed - Blood pressure under 140/90 in past 12 months     BP Readings from Last 3 Encounters:   10/12/21 123/84   06/28/21 120/80   06/15/21 126/86                 Passed - Recent (12 mo) or future (30 days) visit within the authorizing provider's specialty     Patient has had an office visit with the authorizing provider or a provider within the authorizing providers department within the previous 12 mos or has a future within next 30 days. See \"Patient Info\" tab in inbasket, or \"Choose Columns\" in Meds & Orders section of the refill encounter.              Passed - Patient has mammogram in past 2 years on file if age 50-75        Passed - Medication is active on med list        Passed - Patient is 18 years of age or older        Passed - No active pregnancy on record        Passed - No positive pregnancy test on record in past 12 months           Pt last seen 10/12/2021 for annual exam    Last prescribed 10/12/2021 for 90 capsules with 1 refill    Prescription approved per [facility] Refill Protocol.    RN sent refill.    Zaida Moreno RN on 4/7/2022 at 12:06 PM          "

## 2022-10-12 DIAGNOSIS — Z78.0 MENOPAUSE: ICD-10-CM

## 2022-10-12 NOTE — TELEPHONE ENCOUNTER
"Requested Prescriptions   Pending Prescriptions Disp Refills     progesterone (PROMETRIUM) 100 MG capsule [Pharmacy Med Name: PROGESTERONE MICRO 100MG CAPSULES] 90 capsule 1     Sig: TAKE 1 CAPSULE(100 MG) BY MOUTH AT BEDTIME       Hormone Replacement Therapy Passed - 10/12/2022  3:26 AM        Passed - Blood pressure under 140/90 in past 12 months     BP Readings from Last 3 Encounters:   10/12/21 123/84   06/28/21 120/80   06/15/21 126/86                 Passed - Recent (12 mo) or future (30 days) visit within the authorizing provider's specialty     Patient has had an office visit with the authorizing provider or a provider within the authorizing providers department within the previous 12 mos or has a future within next 30 days. See \"Patient Info\" tab in inbasket, or \"Choose Columns\" in Meds & Orders section of the refill encounter.              Passed - Patient has mammogram in past 2 years on file if age 50-75        Passed - Medication is active on med list        Passed - Patient is 18 years of age or older        Passed - No active pregnancy on record        Passed - No positive pregnancy test on record in past 12 months           Pt is due for an annual exam.  No future appts scheduled.    Sending pt a message reminding her she is due for an appt.  Once an appt is scheduled we can send her a matthew refill.    Jayla Sims RN    "

## 2022-10-13 NOTE — TELEPHONE ENCOUNTER
Pt read mychart but did not schedule appt, will give pt a few more days to schedule.    Zaida Moreno RN on 10/13/2022 at 9:12 AM

## 2022-10-17 NOTE — TELEPHONE ENCOUNTER
No appt scheduled yet, pt aware she needs to schedule.    Zaida Moreno RN on 10/17/2022 at 8:56 AM

## 2022-10-20 RX ORDER — PROGESTERONE 100 MG/1
CAPSULE ORAL
Qty: 90 CAPSULE | Refills: 0 | Status: SHIPPED | OUTPATIENT
Start: 2022-10-20 | End: 2022-10-31

## 2023-01-31 ENCOUNTER — OFFICE VISIT (OUTPATIENT)
Dept: OBGYN | Facility: CLINIC | Age: 53
End: 2023-01-31
Payer: COMMERCIAL

## 2023-01-31 VITALS
HEIGHT: 70 IN | DIASTOLIC BLOOD PRESSURE: 84 MMHG | WEIGHT: 206 LBS | SYSTOLIC BLOOD PRESSURE: 120 MMHG | BODY MASS INDEX: 29.49 KG/M2

## 2023-01-31 DIAGNOSIS — J45.909 UNCOMPLICATED ASTHMA, UNSPECIFIED ASTHMA SEVERITY, UNSPECIFIED WHETHER PERSISTENT: ICD-10-CM

## 2023-01-31 DIAGNOSIS — Z00.00 ANNUAL PHYSICAL EXAM: Primary | ICD-10-CM

## 2023-01-31 DIAGNOSIS — Z78.0 MENOPAUSE: ICD-10-CM

## 2023-01-31 PROCEDURE — 99396 PREV VISIT EST AGE 40-64: CPT | Performed by: OBSTETRICS & GYNECOLOGY

## 2023-01-31 RX ORDER — ALBUTEROL SULFATE 0.83 MG/ML
2.5 SOLUTION RESPIRATORY (INHALATION)
COMMUNITY
Start: 2022-12-27

## 2023-01-31 RX ORDER — TRAZODONE HYDROCHLORIDE 50 MG/1
TABLET, FILM COATED ORAL
COMMUNITY
Start: 2022-11-29

## 2023-01-31 RX ORDER — ALBUTEROL SULFATE 90 UG/1
AEROSOL, METERED RESPIRATORY (INHALATION)
COMMUNITY
Start: 2022-11-11

## 2023-01-31 RX ORDER — ESTRADIOL 0.04 MG/D
1 PATCH, EXTENDED RELEASE TRANSDERMAL
Qty: 8 PATCH | Refills: 11 | Status: SHIPPED | OUTPATIENT
Start: 2023-02-02 | End: 2024-01-11

## 2023-01-31 RX ORDER — PROGESTERONE 100 MG/1
100 CAPSULE ORAL AT BEDTIME
Qty: 90 CAPSULE | Refills: 0 | Status: SHIPPED | OUTPATIENT
Start: 2023-01-31 | End: 2023-05-08

## 2023-01-31 RX ORDER — GABAPENTIN 300 MG/1
CAPSULE ORAL
COMMUNITY
Start: 2023-01-26

## 2023-01-31 RX ORDER — MONTELUKAST SODIUM 10 MG/1
1 TABLET ORAL EVERY EVENING
COMMUNITY
Start: 2022-12-27 | End: 2023-08-01

## 2023-01-31 NOTE — PROGRESS NOTES
Chief Complaint   Patient presents with     Physical       Subjective  Richelle Laura is a 53 year old female who presents for her annual exam.  Patient hasn't had a menses since age 46.  She has been taking HRT since 2018.  Patient is not ready to stop this.  She has tried to stop in the past and did not do well.  She understands she should wean off.  Her FSH was 70 in 2019.  No problems urinating.  Normal bowel movements.  Patient is not sexually active.  1 c section.  Patient already received the flu vaccine.  Patient was recently dx with asthma.  She has a pulmonologist she follows up with.      Most recent pap:     History of abnormal Pap smear:  No  History of STI's:  No  History of PID:  No    Family history of uterine cancer:  No  Family history of ovarian cancer:  No  Family history of colon cancer:   No  Family history of breast cancer:  Mother passed at 64=BRCA 1, pancreatic cancer    ROS  ROS: 10 point ROS neg other than the symptoms noted above in the HPI.    Past Medical History:   Diagnosis Date     Migraine      Past Surgical History:   Procedure Laterality Date     ABDOMEN SURGERY        SECTION       KNEE SURGERY       OPERATIVE HYSTEROSCOPY WITH MORCELLATOR N/A 9/10/2019    Procedure: Operative Hysteroscopy, With Morcellator;  Surgeon: Christi Maurer MD;  Location: [facility] OR     Family History   Problem Relation Age of Onset     Pancreatic Cancer Mother      Breast Cancer Maternal Aunt 34        BRCA 1 gene     Social History     Tobacco Use     Smoking status: Every Day     Types: Cigarettes     Smokeless tobacco: Never     Tobacco comments:     3-4 cigarettes a day   Substance Use Topics     Alcohol use: Yes     Alcohol/week: 2.0 - 3.0 standard drinks     Types: 2 - 3 Glasses of wine per week       Tobacco abuse:  No  Do you get at least three servings of calcium containing foods daily (dairy, green leafy vegetables, etc.)? yes   Outside of work or daily activities, how many  days per week do you exercise for 30 minutes or longer? 3-4x per week  The patient does not drink >3 drinks per day nor >7 drinks per week.   Have you had an eye exam in the past two years? yes   Do you see a dentist twice per year? yes   Today's PHQ-2 Score:   Abuse: Current or Past(Physical, Sexual or Emotional)- No   Do you feel safe in your environment - Yes  Objective  Vitals: There were no vitals taken for this visit.  BMI= There is no height or weight on file to calculate BMI.    General appearance=well developed, well-nourished female  Gait=normal  Psych=mood is stable, alert and oriented x3  HEENT=mucous membranes moist  Skin=no rashes or lesions seen,normal turgor   Breast:  Benign exam.  No masses noted.  Non tender. No skin changes, retraction, or nipple discharge.  Axilla feel completely normal, no lymph node enlargement and non-tender.  Neck=overall appearance is normal  Heart=RRR, no murmurs, no swelling noted  Thyroid=normal, no masses, no TTP, no enlargement  Lungs=non-labored breathing, no use of accessory muscles, clear to ausculation bilaterally  Abd=soft, Nontender/nondistended, +bowel sounds x4, no masses, no signs of hernias, no evidence of hepatosplenomegaly  PELVIC:    External genitalia: normal without lesions or masses  Urethral meatus: no lesions or prolapse noted, normal size  Urethra: no masses, non tender  Bladder: non tender, no fullness  Vagina: normal mucosa and rugae, no discharge.  Cervix: normal without lesion, no cervical motion tenderness, healthy, nulliparous  Uterus: small, mobile, nontender.  Adnexa: non tender, without masses  Rectal: deferred  Ext=no clubbing or cyanosis, no swelling      Last lipid profile:  2021  Regular self breast exam:  Yes  Most recent mammogram:  2021  History of abnormal mammogram:  10-11 years ago  Fit testing:  N/A  DEXA:   Not done yet        Assessment/Plan  1.)  Annual/well woman exam=mammogram, DEXA, CBC, CMP, lipids, TSH, fit testing  2.)   Early menopause=continue HRT  3.)  Family history of + BRCA 1=pt tested negative  4.)  Asthma=fu with Pulmonologist as scheduled      The following topics were discussed or recommended   Discussed seat belt, helmet and sunscreen use  Vision screening   Mammogram   Calcium/Vitamin D supplement=Recommended 1000 mg of calcium daily and 800 IU of vitamin D.    30 minutes were spent on the date of the encounter doing chart review, history and exam, documentation, and further activities as noted above.        Mary Bishop, DO

## 2023-02-06 DIAGNOSIS — Z78.0 MENOPAUSE: ICD-10-CM

## 2023-02-06 RX ORDER — PROGESTERONE 100 MG/1
CAPSULE ORAL
Qty: 90 CAPSULE | Refills: 0 | OUTPATIENT
Start: 2023-02-06

## 2023-02-06 NOTE — TELEPHONE ENCOUNTER
"Requested Prescriptions   Pending Prescriptions Disp Refills     progesterone (PROMETRIUM) 100 MG capsule [Pharmacy Med Name: PROGESTERONE MICRO 100MG CAPSULES] 90 capsule 0     Sig: TAKE 1 CAPSULE(100 MG) BY MOUTH AT BEDTIME       Hormone Replacement Therapy Passed - 2/6/2023  7:09 AM        Passed - Blood pressure under 140/90 in past 12 months     BP Readings from Last 3 Encounters:   01/31/23 120/84   10/12/21 123/84   06/28/21 120/80                 Passed - Recent (12 mo) or future (30 days) visit within the authorizing provider's specialty     Patient has had an office visit with the authorizing provider or a provider within the authorizing providers department within the previous 12 mos or has a future within next 30 days. See \"Patient Info\" tab in inbasket, or \"Choose Columns\" in Meds & Orders section of the refill encounter.              Passed - Patient has mammogram in past 2 years on file if age 50-75        Passed - Medication is active on med list        Passed - Patient is 18 years of age or older        Passed - No active pregnancy on record        Passed - No positive pregnancy test on record in past 12 months           Last Written Prescription Date:  1/31/2023 sent to [facility]  Last Fill Quantity: 90,  # refills: 0   Last office visit: 1/31/2023 with prescribing provider:  Dario for physical    Rx not needed. Routing to provider to close out.    Vandana Serrano RN on 2/6/2023 at 8:46 AM              "

## 2023-03-24 RX ORDER — FLUTICASONE FUROATE AND VILANTEROL TRIFENATATE 200; 25 UG/1; UG/1
POWDER RESPIRATORY (INHALATION)
OUTPATIENT
Start: 2023-03-24

## 2023-03-24 NOTE — TELEPHONE ENCOUNTER
Followed by pulmonology for her asthma, no PCP. Refill request should go to pulmonologist ([facility])

## 2023-03-29 NOTE — TELEPHONE ENCOUNTER
Patient Contact    Attempt # 1    Was Call answered? No    Non-detailed voicemail left to call clinic at: [phone].     On Call Back:    Please relay provider's message below.    Amy STILL RN  [facility] Triage Team

## 2023-03-31 NOTE — TELEPHONE ENCOUNTER
Patient given message from Elaina Bradley PA-C. Patient states that she has already been in touch with pulmonologist regarding the refill. Lavern Barlow RN

## 2023-04-22 ENCOUNTER — HEALTH MAINTENANCE LETTER (OUTPATIENT)
Age: 53
End: 2023-04-22

## 2023-05-07 DIAGNOSIS — Z78.0 MENOPAUSE: ICD-10-CM

## 2023-05-08 RX ORDER — PROGESTERONE 100 MG/1
CAPSULE ORAL
Qty: 90 CAPSULE | Refills: 0 | Status: SHIPPED | OUTPATIENT
Start: 2023-05-08 | End: 2023-08-08

## 2023-05-08 NOTE — TELEPHONE ENCOUNTER
"Requested Prescriptions   Pending Prescriptions Disp Refills     progesterone (PROMETRIUM) 100 MG capsule [Pharmacy Med Name: PROGESTERONE MICRO 100MG CAPSULES] 90 capsule 0     Sig: TAKE 1 CAPSULE(100 MG) BY MOUTH AT BEDTIME       Hormone Replacement Therapy Passed - 5/7/2023  4:08 AM        Passed - Blood pressure under 140/90 in past 12 months     BP Readings from Last 3 Encounters:   01/31/23 120/84   10/12/21 123/84   06/28/21 120/80                 Passed - Recent (12 mo) or future (30 days) visit within the authorizing provider's specialty     Patient has had an office visit with the authorizing provider or a provider within the authorizing providers department within the previous 12 mos or has a future within next 30 days. See \"Patient Info\" tab in inbasket, or \"Choose Columns\" in Meds & Orders section of the refill encounter.              Passed - Patient has mammogram in past 2 years on file if age 50-75        Passed - Medication is active on med list        Passed - Patient is 18 years of age or older        Passed - No active pregnancy on record        Passed - No positive pregnancy test on record in past 12 months           Prescription approved per [facility] Refill Protocol.    Jayla Sims RN    "

## 2023-05-31 ENCOUNTER — OFFICE VISIT (OUTPATIENT)
Dept: FAMILY MEDICINE | Facility: CLINIC | Age: 53
End: 2023-05-31
Payer: COMMERCIAL

## 2023-05-31 VITALS
SYSTOLIC BLOOD PRESSURE: 126 MMHG | WEIGHT: 198 LBS | RESPIRATION RATE: 12 BRPM | OXYGEN SATURATION: 95 % | HEART RATE: 86 BPM | TEMPERATURE: 97.5 F | DIASTOLIC BLOOD PRESSURE: 89 MMHG | HEIGHT: 70 IN | BODY MASS INDEX: 28.35 KG/M2

## 2023-05-31 DIAGNOSIS — H92.01 PAIN OF RIGHT MASTOID: Primary | ICD-10-CM

## 2023-05-31 PROCEDURE — 99213 OFFICE O/P EST LOW 20 MIN: CPT | Performed by: FAMILY MEDICINE

## 2023-05-31 RX ORDER — CEPHALEXIN 500 MG/1
CAPSULE ORAL
COMMUNITY
Start: 2023-05-22 | End: 2023-08-01

## 2023-05-31 ASSESSMENT — ASTHMA QUESTIONNAIRES
QUESTION_1 LAST FOUR WEEKS HOW MUCH OF THE TIME DID YOUR ASTHMA KEEP YOU FROM GETTING AS MUCH DONE AT WORK, SCHOOL OR AT HOME: NONE OF THE TIME
QUESTION_4 LAST FOUR WEEKS HOW OFTEN HAVE YOU USED YOUR RESCUE INHALER OR NEBULIZER MEDICATION (SUCH AS ALBUTEROL): TWO OR THREE TIMES PER WEEK
HOSPITALIZATION_OVERNIGHT_LAST_YEAR_TOTAL: TWO
ACT_TOTALSCORE: 21
ACT_TOTALSCORE: 21
QUESTION_5 LAST FOUR WEEKS HOW WOULD YOU RATE YOUR ASTHMA CONTROL: WELL CONTROLLED
QUESTION_2 LAST FOUR WEEKS HOW OFTEN HAVE YOU HAD SHORTNESS OF BREATH: ONCE OR TWICE A WEEK
QUESTION_3 LAST FOUR WEEKS HOW OFTEN DID YOUR ASTHMA SYMPTOMS (WHEEZING, COUGHING, SHORTNESS OF BREATH, CHEST TIGHTNESS OR PAIN) WAKE YOU UP AT NIGHT OR EARLIER THAN USUAL IN THE MORNING: NOT AT ALL

## 2023-05-31 ASSESSMENT — PAIN SCALES - GENERAL: PAINLEVEL: SEVERE PAIN (7)

## 2023-05-31 NOTE — PROGRESS NOTES
"  Assessment & Plan     Pain of right mastoid  Patient recently was started on a course of Keflex.  Today would be her last day of treatment.  It did not make any improvement in her symptoms.  On exam she does not have any signs of infection.  I believe there is some inflammation over the right mastoid bone which is causing a little bit of swelling.  I do not appreciate any lymph node swelling at this time and this was discussed with the patient.    She does have some subtle environmental allergy symptoms for which I recommended start taking Allegra once daily and to lower the inflammation to start taking ibuprofen 400 mg twice daily for the next 5 days.  Ice the affected area.  Avoid pushing over that area.  If in the next 5 to 7 days symptoms are not better or much improved, instructed to notify me back.  Patient agrees to the plan      6}   MED REC REQUIRED  Post Medication Reconciliation Status:   BMI:   Estimated body mass index is 28.47 kg/m  as calculated from the following:    Height as of this encounter: 1.776 m (5' 9.92\").    Weight as of this encounter: 89.8 kg (198 lb).           Isadora Horner MD  [facility]LEY Peoples is a 53 year old, presenting for the following health issues:  Hospital F/U        5/31/2023    12:53 PM   Additional Questions   Roomed by Yvonne KNIGHT     ED/ Followup:    Facility:  [facility]   Date of visit: 5/22/23  Reason for visit: Swollen lymph node  Current Status: no improvement     Patient has that she recently was seen at the urgent care and was prescribed an antibiotic, she is on Keflex at today's the last day.  She has not noticed any improvement.  She was told that she could have a dental infection.  She does not have a dentist but she does not have any dental problems either.  She has some nasal congestion and mild discomfort in the sinus area.  She is using Singulair for asthma management.    She is concerned about " "the lump that she has felt on the right side of her neck behind the ear.  It has not changed as compared to before.  It is slightly tender.  No fever or chills.  Denies any injury.      Review of Systems   CONSTITUTIONAL: NEGATIVE for fever, chills, change in weight  ENT/MOUTH: NEGATIVE for ear, mouth and throat problems  RESP: NEGATIVE for significant cough or SOB  CV: NEGATIVE for chest pain, palpitations or peripheral edema      Objective    /89   Pulse 86   Temp 97.5  F (36.4  C) (Tympanic)   Resp 12   Ht 1.776 m (5' 9.92\")   Wt 89.8 kg (198 lb)   SpO2 95%   BMI 28.47 kg/m    Body mass index is 28.47 kg/m .  Physical Exam   GENERAL: healthy, alert and no distress  HEENT: No active nasal discharge.  Normal oropharynx.  Right maxillary sinus tenderness is equally vocal.  Bilateral ear canals antibiotic membranes appear healthy.  Right-sided mastoid bone has slight bit of swelling over that with mild tenderness.  I do not feel any lymph node in that area  NECK: no adenopathy, no asymmetry, masses, or scars and thyroid normal to palpation  RESP: No audible wheezing                      "

## 2023-08-01 ENCOUNTER — OFFICE VISIT (OUTPATIENT)
Dept: FAMILY MEDICINE | Facility: CLINIC | Age: 53
End: 2023-08-01
Payer: COMMERCIAL

## 2023-08-01 VITALS
TEMPERATURE: 97 F | WEIGHT: 192 LBS | BODY MASS INDEX: 27.49 KG/M2 | DIASTOLIC BLOOD PRESSURE: 80 MMHG | HEART RATE: 81 BPM | OXYGEN SATURATION: 97 % | RESPIRATION RATE: 18 BRPM | HEIGHT: 70 IN | SYSTOLIC BLOOD PRESSURE: 118 MMHG

## 2023-08-01 DIAGNOSIS — Z12.31 ENCOUNTER FOR SCREENING MAMMOGRAM FOR BREAST CANCER: ICD-10-CM

## 2023-08-01 DIAGNOSIS — Z00.00 ANNUAL PHYSICAL EXAM: Primary | ICD-10-CM

## 2023-08-01 DIAGNOSIS — J45.20 MILD INTERMITTENT ASTHMA WITHOUT COMPLICATION: ICD-10-CM

## 2023-08-01 DIAGNOSIS — Z12.11 COLON CANCER SCREENING: ICD-10-CM

## 2023-08-01 PROCEDURE — 99396 PREV VISIT EST AGE 40-64: CPT | Performed by: FAMILY MEDICINE

## 2023-08-01 RX ORDER — MONTELUKAST SODIUM 10 MG/1
1 TABLET ORAL EVERY EVENING
Qty: 90 TABLET | Refills: 3 | Status: SHIPPED | OUTPATIENT
Start: 2023-08-01

## 2023-08-01 ASSESSMENT — ENCOUNTER SYMPTOMS
EYE PAIN: 0
SHORTNESS OF BREATH: 0
NERVOUS/ANXIOUS: 0
PARESTHESIAS: 0
NAUSEA: 0
HEADACHES: 0
MYALGIAS: 0
DIARRHEA: 0
ABDOMINAL PAIN: 0
WEAKNESS: 0
HEMATURIA: 0
CHILLS: 0
PALPITATIONS: 0
JOINT SWELLING: 0
COUGH: 0
FREQUENCY: 0
SORE THROAT: 0
DYSURIA: 0
BREAST MASS: 0
FEVER: 0
DIZZINESS: 0
HEARTBURN: 0
HEMATOCHEZIA: 0
ARTHRALGIAS: 0
CONSTIPATION: 0

## 2023-08-01 ASSESSMENT — ASTHMA QUESTIONNAIRES
EMERGENCY_ROOM_LAST_YEAR_TOTAL: THREE OR MORE
ACT_TOTALSCORE: 23
QUESTION_5 LAST FOUR WEEKS HOW WOULD YOU RATE YOUR ASTHMA CONTROL: WELL CONTROLLED
QUESTION_1 LAST FOUR WEEKS HOW MUCH OF THE TIME DID YOUR ASTHMA KEEP YOU FROM GETTING AS MUCH DONE AT WORK, SCHOOL OR AT HOME: NONE OF THE TIME
QUESTION_4 LAST FOUR WEEKS HOW OFTEN HAVE YOU USED YOUR RESCUE INHALER OR NEBULIZER MEDICATION (SUCH AS ALBUTEROL): ONCE A WEEK OR LESS
ACT_TOTALSCORE: 23
QUESTION_2 LAST FOUR WEEKS HOW OFTEN HAVE YOU HAD SHORTNESS OF BREATH: NOT AT ALL
HOSPITALIZATION_OVERNIGHT_LAST_YEAR_TOTAL: THREE OR MORE
QUESTION_3 LAST FOUR WEEKS HOW OFTEN DID YOUR ASTHMA SYMPTOMS (WHEEZING, COUGHING, SHORTNESS OF BREATH, CHEST TIGHTNESS OR PAIN) WAKE YOU UP AT NIGHT OR EARLIER THAN USUAL IN THE MORNING: NOT AT ALL

## 2023-08-01 ASSESSMENT — PAIN SCALES - GENERAL: PAINLEVEL: NO PAIN (0)

## 2023-08-01 NOTE — PROGRESS NOTES
SUBJECTIVE:   CC: Richelle is an 53 year old who presents for preventive health visit.       2023     1:37 PM   Additional Questions   Roomed by Jyoti Dumont       Healthy Habits:     Getting at least 3 servings of Calcium per day:  Yes    Bi-annual eye exam:  NO    Dental care twice a year:  NO    Sleep apnea or symptoms of sleep apnea:  None    Diet:  Regular (no restrictions)    Frequency of exercise:  None    Taking medications regularly:  Yes    Medication side effects:  None    Additional concerns today:  No                Asthma Follow-Up    Was ACT completed today?  Yes        2023     2:26 PM   ACT Total Scores   ACT TOTAL SCORE (Goal Greater than or Equal to 20) 23   In the past 12 months, how many times did you visit the emergency room for your asthma without being admitted to the hospital? 3   In the past 12 months, how many times were you hospitalized overnight because of your asthma? 3        How many days per week do you miss taking your asthma controller medication?  I do not have an asthma controller medication  Please describe any recent triggers for your asthma: None  Have you had any Emergency Room Visits, Urgent Care Visits, or Hospital Admissions since your last office visit?  No      Social History     Tobacco Use    Smoking status: Former     Types: Cigarettes     Quit date: 10/2021     Years since quittin.8    Smokeless tobacco: Never    Tobacco comments:     3-4 cigarettes a day   Substance Use Topics    Alcohol use: Yes     Alcohol/week: 2.0 - 3.0 standard drinks of alcohol     Types: 2 - 3 Glasses of wine per week             2023    11:18 AM   Alcohol Use   Prescreen: >3 drinks/day or >7 drinks/week? No     Reviewed orders with patient.  Reviewed health maintenance and updated orders accordingly - Yes  Patient Active Problem List   Diagnosis    Migraine with aura    Menopause    Uncomplicated asthma     Past Surgical History:   Procedure Laterality Date    ABDOMEN  SURGERY       SECTION      KNEE SURGERY      OPERATIVE HYSTEROSCOPY WITH MORCELLATOR N/A 9/10/2019    Procedure: Operative Hysteroscopy, With Morcellator;  Surgeon: Christi Maurer MD;  Location:  OR       Social History     Tobacco Use    Smoking status: Former     Types: Cigarettes     Quit date: 10/2021     Years since quittin.8    Smokeless tobacco: Never    Tobacco comments:     3-4 cigarettes a day   Substance Use Topics    Alcohol use: Yes     Alcohol/week: 2.0 - 3.0 standard drinks of alcohol     Types: 2 - 3 Glasses of wine per week     Family History   Problem Relation Age of Onset    Pancreatic Cancer Mother     Breast Cancer Maternal Aunt 34        BRCA 1 gene         Current Outpatient Medications   Medication Sig Dispense Refill    albuterol (PROAIR HFA/PROVENTIL HFA/VENTOLIN HFA) 108 (90 Base) MCG/ACT inhaler INHALE 1 TO 2 PUFFS BY MOUTH EVERY 6 HOURS AS NEEDED FOR WHEEZING      albuterol (PROVENTIL) (2.5 MG/3ML) 0.083% neb solution Inhale 2.5 mg into the lungs      Cholecalciferol (VITAMIN D3 PO) Take 1,000 Units by mouth daily      estradiol (VIVELLE-DOT) 0.0375 MG/24HR BIW patch Place 1 patch onto the skin twice a week 8 patch 11    gabapentin (NEURONTIN) 300 MG capsule       MAGNESIUM PO       montelukast (SINGULAIR) 10 MG tablet Take 1 tablet (10 mg) by mouth every evening 90 tablet 3    progesterone (PROMETRIUM) 100 MG capsule TAKE 1 CAPSULE(100 MG) BY MOUTH AT BEDTIME 90 capsule 0    traZODone (DESYREL) 50 MG tablet TAKE 1 TO 2 TABLETS BY MOUTH EVERY NIGHT AS NEEDED FOR SLEEP       Allergies   Allergen Reactions    Sulfa Antibiotics Hives     PN: Swelling    Compazine [Prochlorperazine]     Phenothiazines      PN:  skin crawls,sleeplessness      Reglan [Metoclopramide]      Tardive Dyskinesia       Breast Cancer Screening:    FHS-7:        No data to display                Mammogram Screening: Recommended annual mammography  Pertinent mammograms are reviewed under the  "imaging tab.    History of abnormal Pap smear: NO - age 30-65 PAP every 5 years with negative HPV co-testing recommended      Latest Ref Rng & Units 6/28/2021    12:00 PM 6/28/2021    11:56 AM   PAP / HPV   PAP (Historical)   NIL    HPV 16 DNA NEG^Negative Negative     HPV 18 DNA NEG^Negative Negative     Other HR HPV NEG^Negative Negative       Reviewed and updated as needed this visit by clinical staff   Tobacco  Allergies  Meds     Fam Hx          Reviewed and updated as needed this visit by Provider    Allergies      Fam Hx             Review of Systems   Constitutional:  Negative for chills and fever.   HENT:  Negative for congestion, ear pain, hearing loss and sore throat.    Eyes:  Negative for pain and visual disturbance.   Respiratory:  Negative for cough and shortness of breath.    Cardiovascular:  Negative for chest pain, palpitations and peripheral edema.   Gastrointestinal:  Negative for abdominal pain, constipation, diarrhea, heartburn, hematochezia and nausea.   Breasts:  Negative for tenderness, breast mass and discharge.   Genitourinary:  Negative for dysuria, frequency, genital sores, hematuria, pelvic pain, urgency, vaginal bleeding and vaginal discharge.   Musculoskeletal:  Negative for arthralgias, joint swelling and myalgias.   Skin:  Negative for rash.   Neurological:  Negative for dizziness, weakness, headaches and paresthesias.   Psychiatric/Behavioral:  Negative for mood changes. The patient is not nervous/anxious.           OBJECTIVE:   /80   Pulse 81   Temp 97  F (36.1  C) (Tympanic)   Resp 18   Ht 1.774 m (5' 9.84\")   Wt 87.1 kg (192 lb)   SpO2 97%   BMI 27.67 kg/m    Physical Exam  GENERAL: healthy, alert and no distress  EYES: Eyes grossly normal to inspection, PERRL and conjunctivae and sclerae normal  HENT: ear canals and TM's normal, nose and mouth without ulcers or lesions  NECK: no adenopathy, no asymmetry, masses, or scars and thyroid normal to palpation  RESP: " "lungs clear to auscultation - no rales, rhonchi or wheezes  BREAST: normal without masses, tenderness or nipple discharge and no palpable axillary masses or adenopathy  CV: regular rate and rhythm, normal S1 S2, no S3 or S4, no murmur, click or rub, no peripheral edema and peripheral pulses strong  ABDOMEN: soft, nontender, no hepatosplenomegaly, no masses and bowel sounds normal  MS: no gross musculoskeletal defects noted, no edema  SKIN: no suspicious lesions or rashes  NEURO: Normal strength and tone, mentation intact and speech normal  PSYCH: mentation appears normal, affect normal/bright        ASSESSMENT/PLAN:   1. Annual physical exam    - Lipid panel reflex to direct LDL Fasting; Future  - Basic metabolic panel; Future    2. Mild intermittent asthma without complication  Well-controlled.  - montelukast (SINGULAIR) 10 MG tablet; Take 1 tablet (10 mg) by mouth every evening  Dispense: 90 tablet; Refill: 3    3. Colon cancer screening    - Fecal colorectal cancer screen (FIT); Future  - Fecal colorectal cancer screen (FIT)    4. Encounter for screening mammogram for breast cancer  - MA Screen Bilateral w/Aristeo; Future            COUNSELING:  Reviewed preventive health counseling, as reflected in patient instructions       Regular exercise       Healthy diet/nutrition      BMI:   Estimated body mass index is 27.67 kg/m  as calculated from the following:    Height as of this encounter: 1.774 m (5' 9.84\").    Weight as of this encounter: 87.1 kg (192 lb).   Weight management plan: Discussed healthy diet and exercise guidelines      She reports that she quit smoking about 22 months ago. Her smoking use included cigarettes. She has never used smokeless tobacco.          Isadora Horner MD  [facility]  "

## 2023-08-08 DIAGNOSIS — Z78.0 MENOPAUSE: ICD-10-CM

## 2023-08-08 RX ORDER — PROGESTERONE 100 MG/1
100 CAPSULE ORAL AT BEDTIME
Qty: 90 CAPSULE | Refills: 0 | Status: SHIPPED | OUTPATIENT
Start: 2023-08-08 | End: 2023-10-26

## 2023-08-08 NOTE — TELEPHONE ENCOUNTER
"Requested Prescriptions   Pending Prescriptions Disp Refills    progesterone (PROMETRIUM) 100 MG capsule 90 capsule 0     Sig: Take 1 capsule (100 mg) by mouth At Bedtime       Hormone Replacement Therapy Failed - 8/8/2023  9:18 AM        Failed - Patient has mammogram in past 2 years on file if age 50-75        Passed - Blood pressure under 140/90 in past 12 months     BP Readings from Last 3 Encounters:   08/01/23 118/80   05/31/23 126/89   01/31/23 120/84                 Passed - Recent (12 mo) or future (30 days) visit within the authorizing provider's specialty     Patient has had an office visit with the authorizing provider or a provider within the authorizing providers department within the previous 12 mos or has a future within next 30 days. See \"Patient Info\" tab in inbasket, or \"Choose Columns\" in Meds & Orders section of the refill encounter.              Passed - Medication is active on med list        Passed - Patient is 18 years of age or older        Passed - No active pregnancy on record        Passed - No positive pregnancy test on record in past 12 months           Pt last seen 1/31/2023 for annual:2.) Early menopause=continue HRT     Last prescribed 5/8/2023 for 90 capsules with 0 refills    Prescription approved per [facility] Refill Protocol.    Zaida Moreno RN on 8/8/2023 at 9:22 AM      "

## 2023-08-10 ENCOUNTER — LAB (OUTPATIENT)
Dept: LAB | Facility: CLINIC | Age: 53
End: 2023-08-10
Payer: COMMERCIAL

## 2023-08-10 DIAGNOSIS — Z00.00 ANNUAL PHYSICAL EXAM: ICD-10-CM

## 2023-08-10 LAB
ANION GAP SERPL CALCULATED.3IONS-SCNC: 9 MMOL/L (ref 7–15)
BUN SERPL-MCNC: 9.3 MG/DL (ref 6–20)
CALCIUM SERPL-MCNC: 9.3 MG/DL (ref 8.6–10)
CHLORIDE SERPL-SCNC: 102 MMOL/L (ref 98–107)
CHOLEST SERPL-MCNC: 177 MG/DL
CREAT SERPL-MCNC: 0.72 MG/DL (ref 0.51–0.95)
DEPRECATED HCO3 PLAS-SCNC: 25 MMOL/L (ref 22–29)
GFR SERPL CREATININE-BSD FRML MDRD: >90 ML/MIN/1.73M2
GLUCOSE SERPL-MCNC: 98 MG/DL (ref 70–99)
HDLC SERPL-MCNC: 44 MG/DL
LDLC SERPL CALC-MCNC: 102 MG/DL
NONHDLC SERPL-MCNC: 133 MG/DL
POTASSIUM SERPL-SCNC: 4.1 MMOL/L (ref 3.4–5.3)
SODIUM SERPL-SCNC: 136 MMOL/L (ref 136–145)
TRIGL SERPL-MCNC: 153 MG/DL

## 2023-08-10 PROCEDURE — 36415 COLL VENOUS BLD VENIPUNCTURE: CPT

## 2023-08-10 PROCEDURE — 80061 LIPID PANEL: CPT

## 2023-08-10 PROCEDURE — 80048 BASIC METABOLIC PNL TOTAL CA: CPT

## 2023-09-13 ENCOUNTER — ANCILLARY PROCEDURE (OUTPATIENT)
Dept: MAMMOGRAPHY | Facility: CLINIC | Age: 53
End: 2023-09-13
Attending: FAMILY MEDICINE
Payer: COMMERCIAL

## 2023-09-13 DIAGNOSIS — Z12.31 ENCOUNTER FOR SCREENING MAMMOGRAM FOR BREAST CANCER: ICD-10-CM

## 2023-09-13 PROCEDURE — 77063 BREAST TOMOSYNTHESIS BI: CPT | Mod: TC | Performed by: RADIOLOGY

## 2023-09-13 PROCEDURE — 77067 SCR MAMMO BI INCL CAD: CPT | Mod: TC | Performed by: RADIOLOGY

## 2023-10-26 DIAGNOSIS — Z78.0 MENOPAUSE: ICD-10-CM

## 2023-10-26 RX ORDER — PROGESTERONE 100 MG/1
100 CAPSULE ORAL AT BEDTIME
Qty: 90 CAPSULE | Refills: 0 | Status: SHIPPED | OUTPATIENT
Start: 2023-10-26 | End: 2024-01-22

## 2023-10-26 NOTE — TELEPHONE ENCOUNTER
"Requested Prescriptions   Pending Prescriptions Disp Refills    progesterone (PROMETRIUM) 100 MG capsule 90 capsule 0     Sig: Take 1 capsule (100 mg) by mouth at bedtime       Hormone Replacement Therapy Passed - 10/26/2023  2:18 PM        Passed - Blood pressure under 140/90 in past 12 months     BP Readings from Last 3 Encounters:   08/01/23 118/80   05/31/23 126/89   01/31/23 120/84                 Passed - Recent (12 mo) or future (30 days) visit within the authorizing provider's specialty     Patient has had an office visit with the authorizing provider or a provider within the authorizing providers department within the previous 12 mos or has a future within next 30 days. See \"Patient Info\" tab in inbasket, or \"Choose Columns\" in Meds & Orders section of the refill encounter.              Passed - Patient has mammogram in past 2 years on file if age 50-75        Passed - Medication is active on med list        Passed - Patient is 18 years of age or older        Passed - No active pregnancy on record        Passed - No positive pregnancy test on record in past 12 months           Prescription approved per [facility] Refill Protocol.    Jayla Sims RN    "

## 2023-12-04 NOTE — TELEPHONE ENCOUNTER
"Requested Prescriptions   Pending Prescriptions Disp Refills     estradiol (VIVELLE-DOT) 0.0375 MG/24HR BIW patch 8 patch 3     Sig: Place 1 patch onto the skin twice a week       Hormone Replacement Therapy Passed - 2/9/2021  2:42 PM        Passed - Blood pressure under 140/90 in past 12 months     BP Readings from Last 3 Encounters:   10/06/20 120/81   03/06/20 (!) 128/91   09/10/19 119/85                 Passed - Recent (12 mo) or future (30 days) visit within the authorizing provider's specialty     Patient has had an office visit with the authorizing provider or a provider within the authorizing providers department within the previous 12 mos or has a future within next 30 days. See \"Patient Info\" tab in inbasket, or \"Choose Columns\" in Meds & Orders section of the refill encounter.              Passed - Patient has mammogram in past 2 years on file if age 50-75        Passed - Medication is active on med list        Passed - Patient is 18 years of age or older        Passed - No active pregnancy on record        Passed - No positive pregnancy test on record in past 12 months           Prescription approved per [facility] Refill Protocol.  Notified pt that rx was sent to her pharmacy.    Jayla Sims RN    " Case Type: OP Block TimeSuite: OR SouthProceduralist: Eliza Ardon  Confirmed Surgery DateTime: 12- - 0:00PAST DateTime: 12- - 9:00Procedure: CIRCUMCISION  ERP?: NoLaterality: N/ALength of Procedure: 60 Minutes  Anesthesia Type: General

## 2024-01-11 ENCOUNTER — TELEPHONE (OUTPATIENT)
Dept: OBGYN | Facility: CLINIC | Age: 54
End: 2024-01-11
Payer: COMMERCIAL

## 2024-01-11 DIAGNOSIS — Z78.0 MENOPAUSE: ICD-10-CM

## 2024-01-11 NOTE — TELEPHONE ENCOUNTER
Requested Prescriptions   Pending Prescriptions Disp Refills    estradiol (VIVELLE-DOT) 0.0375 MG/24HR BIW patch 8 patch 11     Sig: Place 1 patch onto the skin twice a week       There is no refill protocol information for this order        Last seen: 1/31/2023  Last refilled: 2/02/2023 8 patches 11 refills   Patient has upcoming annual appt scheduled w/ Dr. Bishop on 3/19/2024.    Routing to provider for matthew refill to get patient through to upcoming appointment.     Mariel MCINTYRE RN

## 2024-01-11 NOTE — TELEPHONE ENCOUNTER
[facility] Call [facility]    Phone Message    May a detailed message be left on voicemail: yes     Reason for Call: Medication Refill Request    Has the patient contacted the pharmacy for the refill? Yes   Name of medication being requested: estradiol (VIVELLE-DOT) 0.0375 MG/24HR BIW patch    Provider who prescribed the medication: Mary Bishop DO      Pharmacy: [facility] #[id number] [facility] [address] AT [facility] OF [address]    Date medication is needed: ASAP, pt stated to stay on schedule she needs med by tomorrow please.      Action Taken: Message routed to:  Other: [facility]    Travel Screening: Not Applicable

## 2024-01-12 RX ORDER — ESTRADIOL 0.04 MG/D
1 PATCH, EXTENDED RELEASE TRANSDERMAL
Qty: 8 PATCH | Refills: 1 | Status: SHIPPED | OUTPATIENT
Start: 2024-01-15 | End: 2024-02-28

## 2024-01-22 DIAGNOSIS — Z78.0 MENOPAUSE: ICD-10-CM

## 2024-01-22 RX ORDER — PROGESTERONE 100 MG/1
100 CAPSULE ORAL AT BEDTIME
Qty: 90 CAPSULE | Refills: 0 | Status: SHIPPED | OUTPATIENT
Start: 2024-01-22 | End: 2024-03-19

## 2024-01-22 NOTE — TELEPHONE ENCOUNTER
Requested Prescriptions   Pending Prescriptions Disp Refills    progesterone (PROMETRIUM) 100 MG capsule [Pharmacy Med Name: PROGESTERONE 100MG CAPSULES] 90 capsule 0     Sig: TAKE 1 CAPSULE(100 MG) BY MOUTH AT BEDTIME       Hormone Replacement Therapy Passed - 1/22/2024 12:52 PM        Passed - Blood pressure under 140/90 in past 12 months     BP Readings from Last 3 Encounters:   08/01/23 118/80   05/31/23 126/89   01/31/23 120/84                 Passed - Recent (12 mo) or future (30 days) visit within the authorizing provider's specialty     The patient must have completed an in-person or virtual visit within the past 12 months or has a future visit scheduled within the next 90 days with the authorizing provider s specialty.  Urgent care and e-visits do not quality as an office visit for this protocol.          Passed - Patient has mammogram in past 2 years on file if age 50-75        Passed - Medication is active on med list        Passed - Patient is 18 years of age or older        Passed - No active pregnancy on record        Passed - No positive pregnancy test on record in past 12 months           Pt last seen 1/31/2023 for annual    Last prescribed 10/26/2023 for 90 capsules with 0 refills    Pt has appt on 3/19/2024    Prescription approved per [facility] Refill Protocol.    Zaida Moreno RN on 1/22/2024 at 1:12 PM

## 2024-02-28 DIAGNOSIS — Z78.0 MENOPAUSE: ICD-10-CM

## 2024-02-28 RX ORDER — ESTRADIOL 0.04 MG/D
1 PATCH, EXTENDED RELEASE TRANSDERMAL
Qty: 8 PATCH | Refills: 0 | Status: SHIPPED | OUTPATIENT
Start: 2024-02-29 | End: 2024-03-19

## 2024-02-28 NOTE — TELEPHONE ENCOUNTER
Requested Prescriptions   Pending Prescriptions Disp Refills    estradiol (VIVELLE-DOT) 0.0375 MG/24HR BIW patch [Pharmacy Med Name: ESTRADIOL 0.0375MG PATCH (TWICE WK)] 8 patch 1     Sig: APPLY 1 PATCH TOPICALLY TO THE SKIN 2 TIMES A WEEK       Hormone Replacement Therapy Failed - 2/28/2024  2:25 PM        Failed - Recent (12 mo) or future (30 days) visit within the authorizing provider's specialty     The patient must have completed an in-person or virtual visit within the past 12 months or has a future visit scheduled within the next 90 days with the authorizing provider s specialty.  Urgent care and e-visits do not quality as an office visit for this protocol.          Passed - Blood pressure under 140/90 in past 12 months     BP Readings from Last 3 Encounters:   08/01/23 118/80   05/31/23 126/89   01/31/23 120/84                 Passed - Patient has mammogram in past 2 years on file if age 50-75        Passed - Medication is active on med list        Passed - Patient is 18 years of age or older        Passed - No active pregnancy on record        Passed - No positive pregnancy test on record in past 12 months           Pt was last seen on 1/31/23 for an annual physical.    Medication is being filled for 1 time refill only due to:  Patient needs to be seen because it has been more than one year since last visit.  Pt does have an annual physical scheduled with Dr. Bishop on 3/19/23.    Jayla Sims RN

## 2024-03-19 ENCOUNTER — OFFICE VISIT (OUTPATIENT)
Dept: OBGYN | Facility: CLINIC | Age: 54
End: 2024-03-19
Payer: COMMERCIAL

## 2024-03-19 VITALS
SYSTOLIC BLOOD PRESSURE: 124 MMHG | DIASTOLIC BLOOD PRESSURE: 78 MMHG | WEIGHT: 209.3 LBS | BODY MASS INDEX: 29.96 KG/M2 | HEIGHT: 70 IN

## 2024-03-19 DIAGNOSIS — Z78.0 MENOPAUSE: ICD-10-CM

## 2024-03-19 DIAGNOSIS — Z00.00 ANNUAL PHYSICAL EXAM: Primary | ICD-10-CM

## 2024-03-19 PROCEDURE — 99396 PREV VISIT EST AGE 40-64: CPT | Performed by: OBSTETRICS & GYNECOLOGY

## 2024-03-19 RX ORDER — ESTRADIOL 0.04 MG/D
1 PATCH, EXTENDED RELEASE TRANSDERMAL
Qty: 8 PATCH | Refills: 11 | Status: SHIPPED | OUTPATIENT
Start: 2024-03-21

## 2024-03-19 RX ORDER — PROGESTERONE 100 MG/1
100 CAPSULE ORAL AT BEDTIME
Qty: 90 CAPSULE | Refills: 3 | Status: SHIPPED | OUTPATIENT
Start: 2024-03-19

## 2024-03-19 NOTE — PROGRESS NOTES
Chief Complaint   Patient presents with    Physical       Subjective  Richelle Laura is a 54 year old female who presents for her annual exam.  Patient hasn't had a menses since age 46.  She has been taking HRT since 2018.  Patient is not ready to stop this.  She has tried to stop in the past and did not do well.  She understands she should wean off.  Her FSH was 70 in 2019.  No problems urinating.  Normal bowel movements.  Patient is not sexually active.  1 c section.  Patient was recently dx with asthma.  She has a pulmonologist she follows up with.     Most recent pap:   History of abnormal Pap smear:  No    Family history of uterine cancer:   No  Family history of ovarian cancer:  No   Family history of colon cancer:  No  Family history of breast cancer:   Mother passed at 64=BRCA 1, pancreatic cancer    ROS  ROS: 10 point ROS neg other than the symptoms noted above in the HPI.    Past Medical History:   Diagnosis Date    Migraine      Past Surgical History:   Procedure Laterality Date    ABDOMEN SURGERY       SECTION      KNEE SURGERY      OPERATIVE HYSTEROSCOPY WITH MORCELLATOR N/A 9/10/2019    Procedure: Operative Hysteroscopy, With Morcellator;  Surgeon: Christi Maurer MD;  Location:  OR     Family History   Problem Relation Age of Onset    Pancreatic Cancer Mother     Breast Cancer Maternal Aunt 34        BRCA 1 gene     Social History     Tobacco Use    Smoking status: Former     Types: Cigarettes     Quit date: 10/2021     Years since quittin.4    Smokeless tobacco: Never    Tobacco comments:     3-4 cigarettes a day   Substance Use Topics    Alcohol use: Yes     Alcohol/week: 2.0 - 3.0 standard drinks of alcohol     Types: 2 - 3 Glasses of wine per week       Tobacco abuse:  No  Do you get at least three servings of calcium containing foods daily (dairy, green leafy vegetables, etc.)? yes   Outside of work or daily activities, how many days per week do you exercise for 30  "minutes or longer? 3-4x per week  The patient does not drink >3 drinks per day nor >7 drinks per week.   Have you had an eye exam in the past two years? yes   Do you see a dentist twice per year? yes   Today's PHQ-2 Score:   Abuse: Current or Past(Physical, Sexual or Emotional)- No   Do you feel safe in your environment - Yes  Objective  Vitals: /78   Ht 1.774 m (5' 9.84\")   Wt 94.9 kg (209 lb 4.8 oz)   BMI 30.17 kg/m    BMI= Body mass index is 30.17 kg/m .    General appearance=well developed, well-nourished female  Gait=normal  Psych=mood is stable, alert and oriented x3  HEENT=mucous membranes moist  Skin=no rashes or lesions seen,normal turgor   Breast:  Benign exam.  No masses noted.  Non tender. No skin changes, retraction, or nipple discharge.  Axilla feel completely normal, no lymph node enlargement and non-tender.  Neck=overall appearance is normal  Heart=RRR, no murmurs, no swelling noted  Thyroid=normal, no masses, no TTP, no enlargement  Lungs=non-labored breathing, no use of accessory muscles, clear to ausculation bilaterally  Abd=soft, Nontender/nondistended, +bowel sounds x4, no masses, no signs of hernias, no evidence of hepatosplenomegaly  PELVIC:    External genitalia: normal without lesions or masses  Urethral meatus: no lesions or prolapse noted, normal size  Urethra: no masses, non tender  Bladder: non tender, no fullness  Vagina: normal mucosa and rugae, no discharge.  Cervix: normal without lesion, no cervical motion tenderness, healthy, nulliparous  Uterus: small, mobile, nontender.  Adnexa: non tender, without masses  Rectal: deferred  Ext=no clubbing or cyanosis, no swelling      Last lipid profile: 2023  Regular self breast exam:  Yes  Most recent mammogram:   2023  History of abnormal mammogram:  2011-bx=normal  Fit testing:  Patient will do the FIT test  DEXA:  Not done yet        Assessment/Plan  1.)  Annual/well woman exam=mammogram, FIT test (patient already has at home)  2.) "  Early menopause=HRT refilled  3.)  Family history of BRCA1=patient tested negative        The following topics were discussed or recommended   Discussed seat belt, helmet and sunscreen use   Mammogram   Calcium/Vitamin D supplement=Recommended 1000 mg of calcium daily and 800 IU of vitamin D.          Mary Bishop DO

## 2024-03-19 NOTE — PATIENT INSTRUCTIONS
If you have labs or imaging done, the results will automatically release in [employer] without an interpretation.  Your health care professional will review those results and send an interpretation with recommendations as soon as possible, but this may be 1-3 business days.    If you have any questions regarding your visit, please contact your care team.     [employer]: [phone]  [facility] CLINIC HOURS TELEPHONE NUMBER     Mary DO Eulalia Bishop - Certified Medical Assistant    Zaida Lane -   [place] -     Monday- [place]  8:00 a.m - 5:00 p.m    Tuesday- [place]  7:00 a.m - 5:00 p.m    Friday- [place]  9:00 a.m - 5:00 p.m.    Typical Surgery Day: Thursday [facility]  [address]  [place] [address]  Appointment [phone]  Fax: [phone]   Imaging Scheduling- [phone]    [facility]  [address] [place] [address] [place] [address]  [phone]    [address] [address]  Phone: [phone]  Fax: [phone]   Imaging Scheduling- [phone]       **Surgeries** Our Surgery Schedulers will contact you to schedule. If you do not receive a call within 3 business days, please call [phone].    Urgent Care locations:  [facility] Monday-Friday  10 am - 8 pm  Saturday and    9 am - 5 pm  Monday-Friday   10 am - 8 pm  Saturday and    9 am - 5 pm   [phone]     If you need a medication refill, please contact your pharmacy. Please allow 3 business days for your refill to be completed.  As always, Thank you for trusting us with your healthcare needs!    see additional instructions from your care team below

## 2025-02-05 DIAGNOSIS — Z78.0 MENOPAUSE: ICD-10-CM

## 2025-02-05 NOTE — TELEPHONE ENCOUNTER
Requested Prescriptions   Pending Prescriptions Disp Refills    estradiol (VIVELLE-DOT) 0.0375 MG/24HR BIW patch [Pharmacy Med Name: ESTRADIOL 0.0375MG PATCH (TWICE WK)] 8 patch 11     Sig: APPLY 1 PATCH TOPICALLY TO THE SKIN 2 TIMES A WEEK       Hormone Replacement Therapy Passed - 2/5/2025  4:25 PM        Passed - Most recent blood pressure under 140/90 in past 12 months     BP Readings from Last 3 Encounters:   03/19/24 124/78   08/01/23 118/80   05/31/23 126/89       No data recorded            Passed - Medication is active on med list        Passed - Recent (12 mo) or future (90 days) visit within the authorizing provider's specialty     The patient must have completed an in-person or virtual visit within the past 12 months or has a future visit scheduled within the next 90 days with the authorizing provider s specialty.  Urgent care and e-visits do not qualify as an office visit for this protocol.          Passed - Medication indicated for associated diagnosis     The medication is prescribed for one or more of the following conditions:    Menopause   Vulva/Vaginal atrophy   Low Estrogen   Gender Dysphoria   Male to Female transgender          Passed - Patient is 18 years of age or older        Passed - No active pregnancy on record        Passed - No positive pregnancy test on record in past 12 months          progesterone (PROMETRIUM) 100 MG capsule [Pharmacy Med Name: PROGESTERONE MICRO 100MG CAPSULES] 90 capsule 3     Sig: TAKE 1 CAPSULE(100 MG) BY MOUTH AT BEDTIME       Hormone Replacement Therapy Passed - 2/5/2025  4:25 PM        Passed - Most recent blood pressure under 140/90 in past 12 months     BP Readings from Last 3 Encounters:   03/19/24 124/78   08/01/23 118/80   05/31/23 126/89       No data recorded            Passed - Medication is active on med list        Passed - Recent (12 mo) or future (90 days) visit within the authorizing provider's specialty     The patient must have completed an  in-person or virtual visit within the past 12 months or has a future visit scheduled within the next 90 days with the authorizing provider s specialty.  Urgent care and e-visits do not qualify as an office visit for this protocol.          Passed - Medication indicated for associated diagnosis     The medication is prescribed for one or more of the following conditions:    Menopause   Vulva/Vaginal atrophy   Low Estrogen   Gender Dysphoria   Male to Female transgender          Passed - Patient is 18 years of age or older        Passed - No active pregnancy on record        Passed - No positive pregnancy test on record in past 12 months           Last seen: 3/19/2024  Last refilled:   Estradiol patch 3/21/2024 for 8 patches & 11 refills   Progesterone 3/19/2024 for 90 caps & 3 refills    Mychart appointment reminder sent to patient.       Mariel MCINTYRE RN -  OB/GYN group

## 2025-02-06 RX ORDER — ESTRADIOL 0.04 MG/D
1 PATCH, EXTENDED RELEASE TRANSDERMAL
Qty: 8 PATCH | Refills: 2 | Status: SHIPPED | OUTPATIENT
Start: 2025-02-06

## 2025-02-06 RX ORDER — PROGESTERONE 100 MG/1
100 CAPSULE ORAL AT BEDTIME
Qty: 90 CAPSULE | Refills: 0 | Status: SHIPPED | OUTPATIENT
Start: 2025-02-06

## 2025-02-06 NOTE — TELEPHONE ENCOUNTER
Prescription approved per [facility] Refill Protocol. RN sent matthew refills to give pt time to schedule annual/med check in March.    Zaida Moreno RN on 2/6/2025 at 9:41 AM

## 2025-02-27 ENCOUNTER — TELEPHONE (OUTPATIENT)
Dept: OBGYN | Facility: CLINIC | Age: 55
End: 2025-02-27
Payer: COMMERCIAL

## 2025-02-27 NOTE — TELEPHONE ENCOUNTER
estradiol (VIVELLE-DOT) 0.0375 MG/24HR BIW patch was sent to the [facility] pharmacy in [place] on 2/6/25 for a one month supply with 2 additional refills.    Pt needs to reach out  to her pharmacy for refills.    Sending pt a [employer] message.    Jayla Sims RN- OB/GYN group

## 2025-02-27 NOTE — TELEPHONE ENCOUNTER
[facility] Call Center    Phone Message    May a detailed message be left on voicemail: yes     Reason for Call: Medication Refill Request    Has the patient contacted the pharmacy for the refill? Yes   Name of medication being requested: estradiol (VIVELLE-DOT) 0.0375 MG/24HR BIW patch   Provider who prescribed the medication: Mary Bishop MD  Pharmacy: [facility] #[id number] [facility] [address] [facility] [address]  Date medication is needed: ASAP     Pt is requesting a refill for the medication listed above. Please review and call pt to advise at # [phone].    Action Taken: Message routed to:  Other: [facility]    Travel Screening: Not Applicable     Date of Service:

## 2025-04-20 ENCOUNTER — HEALTH MAINTENANCE LETTER (OUTPATIENT)
Age: 55
End: 2025-04-20

## 2025-05-13 ENCOUNTER — OFFICE VISIT (OUTPATIENT)
Dept: OBGYN | Facility: CLINIC | Age: 55
End: 2025-05-13
Payer: COMMERCIAL

## 2025-05-13 VITALS
WEIGHT: 207.1 LBS | DIASTOLIC BLOOD PRESSURE: 90 MMHG | SYSTOLIC BLOOD PRESSURE: 130 MMHG | HEIGHT: 70 IN | BODY MASS INDEX: 29.65 KG/M2

## 2025-05-13 DIAGNOSIS — Z78.0 MENOPAUSE: ICD-10-CM

## 2025-05-13 DIAGNOSIS — Z00.00 ANNUAL PHYSICAL EXAM: Primary | ICD-10-CM

## 2025-05-13 DIAGNOSIS — N95.0 PMB (POSTMENOPAUSAL BLEEDING): ICD-10-CM

## 2025-05-13 PROCEDURE — 3080F DIAST BP >= 90 MM HG: CPT | Performed by: OBSTETRICS & GYNECOLOGY

## 2025-05-13 PROCEDURE — 99213 OFFICE O/P EST LOW 20 MIN: CPT | Mod: 25 | Performed by: OBSTETRICS & GYNECOLOGY

## 2025-05-13 PROCEDURE — 3075F SYST BP GE 130 - 139MM HG: CPT | Performed by: OBSTETRICS & GYNECOLOGY

## 2025-05-13 PROCEDURE — 99459 PELVIC EXAMINATION: CPT | Performed by: OBSTETRICS & GYNECOLOGY

## 2025-05-13 PROCEDURE — 99396 PREV VISIT EST AGE 40-64: CPT | Performed by: OBSTETRICS & GYNECOLOGY

## 2025-05-13 RX ORDER — ESTRADIOL 0.04 MG/D
1 PATCH, EXTENDED RELEASE TRANSDERMAL
Qty: 8 PATCH | Refills: 3 | Status: SHIPPED | OUTPATIENT
Start: 2025-05-15

## 2025-05-13 RX ORDER — BUPROPION HYDROCHLORIDE 100 MG/1
100 TABLET ORAL 2 TIMES DAILY
COMMUNITY

## 2025-05-13 RX ORDER — PROGESTERONE 100 MG/1
100 CAPSULE ORAL AT BEDTIME
Qty: 90 CAPSULE | Refills: 0 | Status: SHIPPED | OUTPATIENT
Start: 2025-05-13

## 2025-05-13 NOTE — PATIENT INSTRUCTIONS
PREVENTIVE HEALTH RECOMMENDATIONS:   Get a physical every year.  A pap test is important to have done starting at age 21 and then every three years as long as your pap is normal.  When you receive the results of your pap test it will include when you need to have your next pap test.    You should be tested each year for chlamydia and gonorrhea if you are aged 16-25 and if you have had a new sexual partner since you were last tested.   Vaccines: Get a flu shot each year.   Eat at least 8-10 servings of fruits and vegetables daily.  Eat whole-grain bread and cereal, whole-wheat pasta and brown rice instead of white grains and white rice.   For bone health: Eat calcium-rich foods (dairy products) or take calcium pills (500 to 600 mg with vitamin D) twice a day with food.   Exercise for an average of 30 minutes a day, 5 days of the week. It can be as simple as taking a brisk walk.  This will help you control your weight and prevent many diseases.   Limit alcohol to one drink per day.   Don't smoke and limit your exposure to second hand smoke.  If you smoke consider making a plan to quit. Go to [employer] and clink on   [employer]  for help   Wear sunscreen with at least SPF15 to prevent skin damage and skin cancer.   Brush your teeth twice a day and floss once a day and see your dentist twice a year for an exam and cleaning.   Have a great year and I will look forward to seeing you next year.   Mary Bishop DO    If you have labs or imaging done, the results will automatically release in [employer] without an interpretation.  Your health care professional will review those results and send an interpretation with recommendations as soon as possible, but this may be 1-3 business days.    If you have any questions regarding your visit, please contact your care team.     [employer] [facility]: [phone]  [facility] CLINIC HOURS TELEPHONE NUMBER       DO Eulalia Allen   YAHIR Cerda-VERNON Dickerson-VERNON David-VERNON Delgado-  [place]-     Monday- [place]  8:00 a.m - 5:00 p.m    Tuesday- [place]  8:00 a.m - 5:00 p.m    Wednesday- OFF    Thursday- Surgery     Friday- [place]  8:00 a.m - 5:00 p.m. [facility]  [address]  [place] [address]  [phone] Fax  Imaging [phone]    [facility]  [address] [place] [address] [place] [address]  [phone]    [address] [place] [address]  [phone] Fax  Imaging [phone]     Urgent Care locations:  [facility] Monday-Friday  10 am - 8 pm  Saturday and Sunday   9 am - 5 pm  Monday-Friday   10 am - 8 pm  Saturday and Sunday   9 am - 5 pm   [phone]     If you need a medication refill, please contact your pharmacy. Please allow 3 business days for your refill to be completed.    As always, thank you for trusting us with your healthcare needs!

## 2025-05-13 NOTE — PROGRESS NOTES
Chief Complaint   Patient presents with    Physical       Subjective  Richelle Laura is a 55 year old female who presents for her annual exam.  Patient hasn't had a menses since age 46.  She did notice some light brown vaginal spotting today along with a small amount of tissue that was tinged red.  She has been taking HRT since 2018.  Patient is not ready to stop this.  She understands she should wean off.  Her FSH was 70 in 2019.  No problems urinating.  Normal bowel movements.  Patient is not sexually active.  1 c section.     Most recent pap:   History of abnormal Pap smear:  No    Family history of uterine cancer:  No  Family history of ovarian cancer: No  Family history of colon cancer:  No  Family history of breast cancer:  No    ROS  ROS: 10 point ROS neg other than the symptoms noted above in the HPI.    Past Medical History:   Diagnosis Date    Migraine      Past Surgical History:   Procedure Laterality Date    ABDOMEN SURGERY       SECTION      KNEE SURGERY      OPERATIVE HYSTEROSCOPY WITH MORCELLATOR N/A 9/10/2019    Procedure: Operative Hysteroscopy, With Morcellator;  Surgeon: Christi Maurer MD;  Location:  OR     Family History   Problem Relation Age of Onset    Pancreatic Cancer Mother     Breast Cancer Maternal Aunt 34        BRCA 1 gene     Social History     Tobacco Use    Smoking status: Former     Current packs/day: 0.00     Types: Cigarettes     Quit date: 10/2021     Years since quitting: 3.6    Smokeless tobacco: Never    Tobacco comments:     3-4 cigarettes a day   Substance Use Topics    Alcohol use: Yes     Alcohol/week: 2.0 - 3.0 standard drinks of alcohol     Types: 2 - 3 Glasses of wine per week       Tobacco abuse:  No  Do you get at least three servings of calcium containing foods daily (dairy, green leafy vegetables, etc.)? yes   Outside of work or daily activities, how many days per week do you exercise for 30 minutes or longer? 3-4x per week  The patient does  "not drink >3 drinks per day nor >7 drinks per week.   Have you had an eye exam in the past two years? yes   Do you see a dentist twice per year? yes   Today's PHQ-2 Score:   Abuse: Current or Past(Physical, Sexual or Emotional)- No   Do you feel safe in your environment - Yes  Objective  Vitals: /90   Ht 1.774 m (5' 9.84\")   Wt 93.9 kg (207 lb 1.6 oz)   BMI 29.85 kg/m    BMI= Body mass index is 29.85 kg/m .    General appearance=well developed, well-nourished female  Gait=normal  Psych=mood is stable, alert and oriented x3  HEENT=mucous membranes moist  Skin=no rashes or lesions seen,normal turgor   Breast:  Benign exam.  No masses noted.  Non tender. No skin changes, retraction, or nipple discharge.  Axilla feel completely normal, no lymph node enlargement and non-tender.  Neck=overall appearance is normal  Heart=RRR, no murmurs, no swelling noted  Thyroid=normal, no masses, no TTP, no enlargement  Lungs=non-labored breathing, no use of accessory muscles, clear to ausculation bilaterally  Abd=soft, Nontender/nondistended, +bowel sounds x4, no masses, no signs of hernias, no evidence of hepatosplenomegaly  PELVIC:    External genitalia: normal without lesions or masses  Urethral meatus: no lesions or prolapse noted, normal size  Urethra: no masses, non tender  Bladder: non tender, no fullness  Vagina: normal mucosa and rugae, no discharge.  Cervix: normal without lesion, no cervical motion tenderness, healthy, multiparous  Uterus: small, mobile, nontender.  Adnexa: non tender, without masses  Rectal: deferred  Ext=no clubbing or cyanosis, no swelling      Last lipid profile:  2023  Regular self breast exam:   Yes  Most recent mammogram:  2023  History of abnormal mammogram:  Yes-bx nml  Fit testing:  N/A  DEXA:  N/A        Assessment/Plan  1.)  Annual/well woman exam=CBC, CMP, lipids, TSH, mammogram, colonoscopy  2.)  Post menopausal bleeding=pelvic ultrasound ordered, recommended endometrial biopsy-patient " wants to do pelvic ultrasound first  3.)  Menopausal signs and symptoms=continue HRT, ordered refills, consider stopping soon      The following topics were discussed or recommended   Discussed seat belt, helmet and sunscreen use   Mammogram   Calcium/Vitamin D supplement=Recommended 1000 mg of calcium daily and 800 IU of vitamin D.      Discussed hormone replacement therapy.  Explained that the primary use is for vasomotor symptom control as well as for urogenital symptoms.  Discussed the concerns related to systemic hormones including, but not limited to, increased risks of cardiovascular complications such as heart attack and increased risk of breast cancer.  I did discuss our current understanding of these risks as well as the controversy surrounding some of the study results.    We have reviwed her past medical and family history as it relates to thrombotic, cardiovascular and cancer risks.    She appears to understand and wants to continue them.           Mary Bishop, DO

## 2025-05-14 ENCOUNTER — PATIENT OUTREACH (OUTPATIENT)
Dept: CARE COORDINATION | Facility: CLINIC | Age: 55
End: 2025-05-14
Payer: COMMERCIAL

## 2025-08-14 ENCOUNTER — PATIENT OUTREACH (OUTPATIENT)
Dept: CARE COORDINATION | Facility: CLINIC | Age: 55
End: 2025-08-14
Payer: COMMERCIAL

## (undated) DEVICE — SOL WATER IRRIG 1000ML BOTTLE 2F7114

## (undated) DEVICE — GLOVE PROTEXIS W/NEU-THERA 6.5  2D73TE65

## (undated) DEVICE — Device

## (undated) DEVICE — ESU HOLDER LAP INST DISP YELLOW SHORT 250MM H-PRO-250

## (undated) DEVICE — SUCTION CANISTER BEMIS HI FLOW 006772-901

## (undated) DEVICE — LIGHT HANDLE X2

## (undated) DEVICE — TUBING SYS AQUILEX BLUE INFLOW AQL-110 YLW OUTFLOW AQL-111

## (undated) DEVICE — SOL NACL 0.9% IRRIG 3000ML BAG 2B7477

## (undated) DEVICE — DEVICE TISSUE REMOVAL HYSTEROSCOPIC MYOSURE LITE 30-401LITE

## (undated) DEVICE — SPECIMEN BAG BEMIS HI FLOW SUCTION WHITE SOCK 533810

## (undated) DEVICE — LINEN GOWN X4 5410

## (undated) DEVICE — GLOVE PROTEXIS BLUE W/NEU-THERA 7.0  2D73EB70

## (undated) DEVICE — SOL NACL 0.9% IRRIG 1000ML BOTTLE 2F7124

## (undated) DEVICE — PAD CHUX UNDERPAD 30X36" P3036C

## (undated) DEVICE — SEAL SET MYOSURE ROD LENS SCOPE SINGLE USE 40-902

## (undated) DEVICE — LINEN TOWEL PACK X5 5464

## (undated) RX ORDER — OXYCODONE HYDROCHLORIDE 5 MG/1
TABLET ORAL
Status: DISPENSED
Start: 2019-09-10

## (undated) RX ORDER — ACETAMINOPHEN 325 MG/1
TABLET ORAL
Status: DISPENSED
Start: 2019-09-10

## (undated) RX ORDER — FENTANYL CITRATE 50 UG/ML
INJECTION, SOLUTION INTRAMUSCULAR; INTRAVENOUS
Status: DISPENSED
Start: 2019-09-10

## (undated) RX ORDER — LIDOCAINE HYDROCHLORIDE 10 MG/ML
INJECTION, SOLUTION EPIDURAL; INFILTRATION; INTRACAUDAL; PERINEURAL
Status: DISPENSED
Start: 2019-09-10

## (undated) RX ORDER — HYDROMORPHONE HYDROCHLORIDE 1 MG/ML
INJECTION, SOLUTION INTRAMUSCULAR; INTRAVENOUS; SUBCUTANEOUS
Status: DISPENSED
Start: 2019-09-10

## (undated) RX ORDER — KETAMINE HCL IN 0.9 % NACL 50 MG/5 ML
SYRINGE (ML) INTRAVENOUS
Status: DISPENSED
Start: 2019-09-10